# Patient Record
Sex: FEMALE | Race: WHITE | NOT HISPANIC OR LATINO | Employment: STUDENT | URBAN - METROPOLITAN AREA
[De-identification: names, ages, dates, MRNs, and addresses within clinical notes are randomized per-mention and may not be internally consistent; named-entity substitution may affect disease eponyms.]

---

## 2017-12-28 ENCOUNTER — TRANSCRIBE ORDERS (OUTPATIENT)
Dept: ADMINISTRATIVE | Facility: HOSPITAL | Age: 22
End: 2017-12-28

## 2017-12-28 ENCOUNTER — HOSPITAL ENCOUNTER (OUTPATIENT)
Dept: RADIOLOGY | Facility: HOSPITAL | Age: 22
Discharge: HOME | End: 2017-12-28
Attending: INTERNAL MEDICINE
Payer: COMMERCIAL

## 2017-12-28 ENCOUNTER — GENERIC CONVERSION - ENCOUNTER (OUTPATIENT)
Dept: OTHER | Facility: OTHER | Age: 22
End: 2017-12-28

## 2017-12-28 DIAGNOSIS — R07.9 CHEST PAIN, UNSPECIFIED TYPE: ICD-10-CM

## 2017-12-28 DIAGNOSIS — R06.02 SOB (SHORTNESS OF BREATH): ICD-10-CM

## 2017-12-28 DIAGNOSIS — R06.02 SOB (SHORTNESS OF BREATH): Primary | ICD-10-CM

## 2017-12-28 PROCEDURE — 71275 CT ANGIOGRAPHY CHEST: CPT

## 2017-12-28 RX ADMIN — IOHEXOL 85 ML: 350 INJECTION, SOLUTION INTRAVENOUS at 11:16

## 2019-05-15 ENCOUNTER — OFFICE VISIT (OUTPATIENT)
Dept: OBGYN CLINIC | Facility: CLINIC | Age: 24
End: 2019-05-15
Payer: COMMERCIAL

## 2019-05-15 VITALS
BODY MASS INDEX: 27.56 KG/M2 | WEIGHT: 146 LBS | SYSTOLIC BLOOD PRESSURE: 130 MMHG | HEIGHT: 61 IN | DIASTOLIC BLOOD PRESSURE: 84 MMHG

## 2019-05-15 DIAGNOSIS — Z71.85 HPV VACCINE COUNSELING: ICD-10-CM

## 2019-05-15 DIAGNOSIS — Z01.419 ENCOUNTER FOR GYNECOLOGICAL EXAMINATION (GENERAL) (ROUTINE) WITHOUT ABNORMAL FINDINGS: Primary | ICD-10-CM

## 2019-05-15 PROCEDURE — 99385 PREV VISIT NEW AGE 18-39: CPT | Performed by: PHYSICIAN ASSISTANT

## 2019-05-15 RX ORDER — NORETHINDRONE ACETATE AND ETHINYL ESTRADIOL AND FERROUS FUMARATE 1.5-30(21)
1 KIT ORAL DAILY
Refills: 5 | COMMUNITY
Start: 2019-04-20 | End: 2019-08-07 | Stop reason: SDUPTHER

## 2019-05-15 RX ORDER — PREDNISONE 10 MG/1
TABLET ORAL
Refills: 0 | COMMUNITY
Start: 2019-05-02 | End: 2021-10-21

## 2019-05-27 LAB — THIN PREP CVX: NORMAL

## 2019-08-07 ENCOUNTER — TELEPHONE (OUTPATIENT)
Dept: OBGYN CLINIC | Facility: CLINIC | Age: 24
End: 2019-08-07

## 2019-08-07 DIAGNOSIS — Z30.41 ENCOUNTER FOR SURVEILLANCE OF CONTRACEPTIVE PILLS: Primary | ICD-10-CM

## 2019-08-07 RX ORDER — NORETHINDRONE ACETATE AND ETHINYL ESTRADIOL AND FERROUS FUMARATE 1.5-30(21)
1 KIT ORAL DAILY
Qty: 90 TABLET | Refills: 3 | Status: SHIPPED | OUTPATIENT
Start: 2019-08-07 | End: 2021-10-21

## 2019-09-16 ENCOUNTER — TELEPHONE (OUTPATIENT)
Dept: OBGYN CLINIC | Facility: CLINIC | Age: 24
End: 2019-09-16

## 2019-09-16 NOTE — TELEPHONE ENCOUNTER
Apt scheduled  [facility] since states gets [facility]   Pt to call back if seems abnormal for her otherwise will keep apt as scheduled

## 2019-12-28 ENCOUNTER — APPOINTMENT (EMERGENCY)
Dept: RADIOLOGY | Facility: HOSPITAL | Age: 24
End: 2019-12-28
Payer: COMMERCIAL

## 2019-12-28 ENCOUNTER — HOSPITAL ENCOUNTER (EMERGENCY)
Facility: HOSPITAL | Age: 24
Discharge: HOME | End: 2019-12-28
Attending: EMERGENCY MEDICINE | Admitting: EMERGENCY MEDICINE
Payer: COMMERCIAL

## 2019-12-28 VITALS
TEMPERATURE: 98.6 F | BODY MASS INDEX: 28.34 KG/M2 | WEIGHT: 150 LBS | HEART RATE: 64 BPM | SYSTOLIC BLOOD PRESSURE: 112 MMHG | DIASTOLIC BLOOD PRESSURE: 57 MMHG | RESPIRATION RATE: 20 BRPM | OXYGEN SATURATION: 98 %

## 2019-12-28 DIAGNOSIS — R07.89 CHEST WALL PAIN: Primary | ICD-10-CM

## 2019-12-28 LAB
ANION GAP SERPL CALCULATED.3IONS-SCNC: 7 MMOL/L (ref 4–13)
ATRIAL RATE: 67 BPM
BASOPHILS # BLD AUTO: 0.02 THOUSANDS/ΜL (ref 0–0.1)
BASOPHILS NFR BLD AUTO: 0 % (ref 0–1)
BUN SERPL-MCNC: 14 MG/DL (ref 5–25)
CALCIUM SERPL-MCNC: 9.4 MG/DL (ref 8.3–10.1)
CHLORIDE SERPL-SCNC: 108 MMOL/L (ref 100–108)
CO2 SERPL-SCNC: 26 MMOL/L (ref 21–32)
CREAT SERPL-MCNC: 0.81 MG/DL (ref 0.6–1.3)
EOSINOPHIL # BLD AUTO: 0.03 THOUSAND/ΜL (ref 0–0.61)
EOSINOPHIL NFR BLD AUTO: 1 % (ref 0–6)
ERYTHROCYTE [DISTWIDTH] IN BLOOD BY AUTOMATED COUNT: 11.9 % (ref 11.6–15.1)
EXT PREG TEST URINE: NEGATIVE
EXT. CONTROL ED NAV: NORMAL
GFR SERPL CREATININE-BSD FRML MDRD: 103 ML/MIN/1.73SQ M
GLUCOSE SERPL-MCNC: 86 MG/DL (ref 65–140)
HCT VFR BLD AUTO: 38.3 % (ref 34.8–46.1)
HGB BLD-MCNC: 12.7 G/DL (ref 11.5–15.4)
IMM GRANULOCYTES # BLD AUTO: 0.02 THOUSAND/UL (ref 0–0.2)
IMM GRANULOCYTES NFR BLD AUTO: 0 % (ref 0–2)
LYMPHOCYTES # BLD AUTO: 2.47 THOUSANDS/ΜL (ref 0.6–4.47)
LYMPHOCYTES NFR BLD AUTO: 41 % (ref 14–44)
MCH RBC QN AUTO: 30.5 PG (ref 26.8–34.3)
MCHC RBC AUTO-ENTMCNC: 33.2 G/DL (ref 31.4–37.4)
MCV RBC AUTO: 92 FL (ref 82–98)
MONOCYTES # BLD AUTO: 0.41 THOUSAND/ΜL (ref 0.17–1.22)
MONOCYTES NFR BLD AUTO: 7 % (ref 4–12)
NEUTROPHILS # BLD AUTO: 3.11 THOUSANDS/ΜL (ref 1.85–7.62)
NEUTS SEG NFR BLD AUTO: 51 % (ref 43–75)
NRBC BLD AUTO-RTO: 0 /100 WBCS
P AXIS: 37 DEGREES
PLATELET # BLD AUTO: 265 THOUSANDS/UL (ref 149–390)
PMV BLD AUTO: 9.3 FL (ref 8.9–12.7)
POTASSIUM SERPL-SCNC: 3.7 MMOL/L (ref 3.5–5.3)
PR INTERVAL: 154 MS
QRS AXIS: 54 DEGREES
QRSD INTERVAL: 78 MS
QT INTERVAL: 416 MS
QTC INTERVAL: 439 MS
RBC # BLD AUTO: 4.16 MILLION/UL (ref 3.81–5.12)
SODIUM SERPL-SCNC: 141 MMOL/L (ref 136–145)
T WAVE AXIS: 46 DEGREES
TROPONIN I SERPL-MCNC: <0.02 NG/ML
VENTRICULAR RATE: 67 BPM
WBC # BLD AUTO: 6.06 THOUSAND/UL (ref 4.31–10.16)

## 2019-12-28 PROCEDURE — 80048 BASIC METABOLIC PNL TOTAL CA: CPT | Performed by: EMERGENCY MEDICINE

## 2019-12-28 PROCEDURE — 81025 URINE PREGNANCY TEST: CPT | Performed by: EMERGENCY MEDICINE

## 2019-12-28 PROCEDURE — 84484 ASSAY OF TROPONIN QUANT: CPT | Performed by: EMERGENCY MEDICINE

## 2019-12-28 PROCEDURE — 99285 EMERGENCY DEPT VISIT HI MDM: CPT

## 2019-12-28 PROCEDURE — 85025 COMPLETE CBC W/AUTO DIFF WBC: CPT | Performed by: EMERGENCY MEDICINE

## 2019-12-28 PROCEDURE — 93010 ELECTROCARDIOGRAM REPORT: CPT | Performed by: INTERNAL MEDICINE

## 2019-12-28 PROCEDURE — 93005 ELECTROCARDIOGRAM TRACING: CPT

## 2019-12-28 PROCEDURE — 71046 X-RAY EXAM CHEST 2 VIEWS: CPT

## 2019-12-28 PROCEDURE — 99284 EMERGENCY DEPT VISIT MOD MDM: CPT | Performed by: EMERGENCY MEDICINE

## 2019-12-28 PROCEDURE — 96372 THER/PROPH/DIAG INJ SC/IM: CPT

## 2019-12-28 PROCEDURE — 96360 HYDRATION IV INFUSION INIT: CPT

## 2019-12-28 PROCEDURE — 36415 COLL VENOUS BLD VENIPUNCTURE: CPT | Performed by: EMERGENCY MEDICINE

## 2019-12-28 RX ORDER — KETOROLAC TROMETHAMINE 30 MG/ML
15 INJECTION, SOLUTION INTRAMUSCULAR; INTRAVENOUS ONCE
Status: COMPLETED | OUTPATIENT
Start: 2019-12-28 | End: 2019-12-28

## 2019-12-28 RX ADMIN — KETOROLAC TROMETHAMINE 15 MG: 30 INJECTION, SOLUTION INTRAMUSCULAR at 15:23

## 2019-12-28 RX ADMIN — SODIUM CHLORIDE 1000 ML: 0.9 INJECTION, SOLUTION INTRAVENOUS at 15:00

## 2019-12-28 NOTE — DISCHARGE INSTRUCTIONS
Take tylenol and/or ibuprofen for pain  Your chest x-ray, EKG, and troponin level, and CBC/BMP blood work was all normal

## 2019-12-28 NOTE — ED PROVIDER NOTES
History  Chief Complaint   Patient presents with    Chest Pain     Patient reports mid sternal chest pain that does not radiate  States she "has had this in the past, had MRI, EKG and they never found anything"  Patient is a 16-year-old female with no significant past medical history who presents to the emergency department for evaluation of chest pain  Her chest pain started suddenly on Wednesday while she was at rest, is has been constant since onset, and has not changed in quality or intensity  She describes it is a dull ache located substernally  Her pain does not radiate  There are no exacerbating or alleviating factors to her pain, is not pleuritic in nature  She denies cough, recent URI, fevers or chills  She also endorses exertion dyspnea, but is able to ambulate long distances without having to rest  She denies lower extremity edema, calf pain or tenderness  She notes that she has had intermittent lightheadedness, but denies near-syncope or syncope, no headache, neck pain or neck stiffness, no focal neurological deficits hitting motor weakness or sensory deficits  She denies vaginal bleeding  Last menstrual period was 1 week ago  He is on contraception, estrogen and progestin containing of OCP  No personal history of DVT/PE, no family history of DVT/PE, no recent surgeries or prolonged immobilizations, no history of cancer, no asymmetric lower extremity edema  Does have a prior history of chest pain, states that several years ago she had chest pain and had a workup and was told everything was normal " No drugs, no alcohol, nonsmoker        History provided by:  Patient   used: No    Chest Pain   Pain location:  Substernal area  Pain quality: dull    Pain radiates to:  Does not radiate  Pain radiates to the back: no    Onset quality:  Sudden  Duration:  4 days  Timing:  Constant  Progression:  Unchanged  Chronicity:  Recurrent  Context: at rest    Context: not breathing, no movement, no stress and no trauma    Relieved by:  None tried  Worsened by:  Nothing tried  Ineffective treatments:  None tried  Associated symptoms: shortness of breath    Associated symptoms: no abdominal pain, no back pain, no cough, no dizziness, no fever, no headache, no nausea, no palpitations, not vomiting and no weakness    Risk factors: no coronary artery disease, no diabetes mellitus, no high cholesterol, no hypertension, not pregnant, no prior DVT/PE and no smoking        Prior to Admission Medications   Prescriptions Last Dose Informant Patient Reported? Taking? JUNEL FE 1 5/30 1 5-30 MG-MCG tablet 12/27/2019 at Unknown time  No Yes   Sig: Take 1 tablet by mouth daily   predniSONE 10 mg tablet Not Taking at Unknown time  Yes No   Sig: TAKE 8 TABS X 3 DAYS, 6 TABS X 3 DAYS, 4 TABS X 3 DAYS, 3 TABS X 3 DAYS, 2 TABS X 3 DAYS,1 TAB DAILY      Facility-Administered Medications: None       Past Medical History:   Diagnosis Date    Asthma        Past Surgical History:   Procedure Laterality Date    TONSILLECTOMY         Family History   Problem Relation Age of Onset    No Known Problems Mother     No Known Problems Father      I have reviewed and agree with the history as documented  Social History     Tobacco Use    Smoking status: Never Smoker    Smokeless tobacco: Never Used   Substance Use Topics    Alcohol use: Yes     Frequency: 2-3 times a week    Drug use: Never        Review of Systems   Constitutional: Negative  Negative for appetite change, chills and fever  HENT: Negative  Eyes: Negative  Negative for photophobia and visual disturbance  Respiratory: Positive for shortness of breath  Negative for cough and chest tightness  Cardiovascular: Positive for chest pain  Negative for palpitations and leg swelling  Gastrointestinal: Negative  Negative for abdominal pain, blood in stool, constipation, diarrhea, nausea and vomiting  Endocrine: Negative      Genitourinary: Negative  Negative for difficulty urinating, dysuria, flank pain, frequency and urgency  Musculoskeletal: Negative  Negative for back pain, neck pain and neck stiffness  Skin: Negative  Allergic/Immunologic: Negative  Neurological: Positive for light-headedness  Negative for dizziness, weakness and headaches  Hematological: Negative  Psychiatric/Behavioral: Negative  Physical Exam  ED Triage Vitals [12/28/19 1339]   Temperature Pulse Respirations Blood Pressure SpO2   98 6 °F (37 °C) 69 18 155/85 99 %      Temp Source Heart Rate Source Patient Position - Orthostatic VS BP Location FiO2 (%)   Oral Monitor Sitting Left arm --      Pain Score       8             Orthostatic Vital Signs  Vitals:    12/28/19 1339 12/28/19 1456 12/28/19 1530   BP: 155/85 123/57 112/57   Pulse: 69 58 64   Patient Position - Orthostatic VS: Sitting Lying        Physical Exam   Constitutional: She is oriented to person, place, and time  She appears well-developed and well-nourished  HENT:   Head: Normocephalic and atraumatic  Right Ear: External ear normal    Left Ear: External ear normal    Nose: Nose normal    Mouth/Throat: Oropharynx is clear and moist    Eyes: Conjunctivae and EOM are normal  No scleral icterus  Neck: Normal range of motion  No JVD present  No tracheal deviation present  Cardiovascular: Normal rate, regular rhythm, normal heart sounds and intact distal pulses  No murmur heard  Pulmonary/Chest: Effort normal and breath sounds normal  No respiratory distress  She exhibits tenderness  She exhibits no crepitus  Abdominal: Soft  Bowel sounds are normal  She exhibits no distension  There is no tenderness  There is no guarding  Musculoskeletal: Normal range of motion  She exhibits no edema  Neurological: She is alert and oriented to person, place, and time  She exhibits normal muscle tone  Skin: Skin is warm and dry  Capillary refill takes less than 2 seconds   She is not diaphoretic  Psychiatric: She has a normal mood and affect  Her behavior is normal    Vitals reviewed        ED Medications  Medications   ketorolac (TORADOL) injection 15 mg (15 mg Intramuscular Given 12/28/19 1523)   sodium chloride 0 9 % bolus 1,000 mL (0 mL Intravenous Stopped 12/28/19 1550)       Diagnostic Studies  Results Reviewed     Procedure Component Value Units Date/Time    Troponin I [[id number]]  (Normal) Collected:  12/28/19 1458    Lab Status:  Final result Specimen:  Blood from Arm, Right Updated:  12/28/19 1524     Troponin I <0 02 ng/mL     Basic metabolic panel [[id number]] Collected:  12/28/19 1458    Lab Status:  Final result Specimen:  Blood from Arm, Right Updated:  12/28/19 1520     Sodium 141 mmol/L      Potassium 3 7 mmol/L      Chloride 108 mmol/L      CO2 26 mmol/L      ANION GAP 7 mmol/L      BUN 14 mg/dL      Creatinine 0 81 mg/dL      Glucose 86 mg/dL      Calcium 9 4 mg/dL      eGFR 103 ml/min/1 73sq m     Narrative:       [place] guidelines for Chronic Kidney Disease (CKD):     Stage 1 with normal or high GFR (GFR > 90 mL/min/1 73 square meters)    Stage 2 Mild CKD (GFR = 60-89 mL/min/1 73 square meters)    Stage 3A Moderate CKD (GFR = 45-59 mL/min/1 73 square meters)    Stage 3B Moderate CKD (GFR = 30-44 mL/min/1 73 square meters)    Stage 4 Severe CKD (GFR = 15-29 mL/min/1 73 square meters)    Stage 5 End Stage CKD (GFR <15 mL/min/1 73 square meters)  Note: GFR calculation is accurate only with a steady state creatinine    CBC and differential [[id number]] Collected:  12/28/19 1458    Lab Status:  Final result Specimen:  Blood from Arm, Right Updated:  12/28/19 1507     WBC 6 06 Thousand/uL      RBC 4 16 Million/uL      Hemoglobin 12 7 g/dL      Hematocrit 38 3 %      MCV 92 fL      MCH 30 5 pg      MCHC 33 2 g/dL      RDW 11 9 %      MPV 9 3 fL      Platelets 905 Thousands/uL      nRBC 0 /100 WBCs      Neutrophils Relative 51 %      Immat GRANS % 0 %      Lymphocytes Relative 41 %      Monocytes Relative 7 %      Eosinophils Relative 1 %      Basophils Relative 0 %      Neutrophils Absolute 3 11 Thousands/µL      Immature Grans Absolute 0 02 Thousand/uL      Lymphocytes Absolute 2 47 Thousands/µL      Monocytes Absolute 0 41 Thousand/µL      Eosinophils Absolute 0 03 Thousand/µL      Basophils Absolute 0 02 Thousands/µL     POCT pregnancy, urine [[id number]]  (Normal) Resulted:  12/28/19 1504    Lab Status:  Final result Updated:  12/28/19 1504     EXT PREG TEST UR (Ref: Negative) NEGATIVE     Control VALID                 XR chest 2 views   ED Interpretation by Kt Hill DO (12/28 1522)   No acute cardiopulmonary processes             Procedures  Procedures      ED Course  ED Course as of Dec 28 1659   Sat Dec 28, 2019   1524 Procedure Note: EKG  Date/Time: 12/28/19 3:24 PM   Interpreted by: Phil Crockett DO  Indications / Diagnosis: CP  ECG reviewed by me, the ED Physician: yes   The EKG demonstrates:  Rhythm: normal sinus w/ sinus arrhythmia   Intervals: normal intervals  Axis: normal axis  QRS/Blocks: normal QRS  ST Changes: No acute ST Changes, no STD/LESVIA  Juvenile T wave inversion anterior precordial leads               HEART Risk Score      Most Recent Value   History  0 Filed at: 12/28/2019 1651   ECG  --   Age  0 Filed at: 12/28/2019 1651   Risk Factors  0 Filed at: 12/28/2019 1651   Troponin  0 Filed at: 12/28/2019 1651   Heart Score Risk Calculator   History  0 Filed at: 12/28/2019 1651   ECG  --   Age  0 Filed at: 12/28/2019 1651   Risk Factors  0 Filed at: 12/28/2019 1651   Troponin  0 Filed at: 12/28/2019 1651   HEART Score  --   HEART Score  --                            MDM  Number of Diagnoses or Management Options  Chest wall pain: new and requires workup  Diagnosis management comments: 46-year-old female with constant chest pain for 3 days    Will obtain EKG to look for acute ischemic changes, troponin, chest x-ray to evaluate for other causes of chest pain such as a pneumothorax, pneumonia and CBC to evaluate for anemia  Chest x-ray reveals no acute cardiopulmonary processes  EKG reveals sinus rhythm with a sinus arrhythmia, no ST or T-wave changes, no ST elevation or depression, normal axis and normal intervals  Troponin is within normal limits, and a CBC is within normal limits  Patient is not tachycardic, no hypoxia, her respiratory rate is normal   Patient has reproducible chest pain on exam, will give Toradol for pain  Patient was reassessed after Toradol was given and she noted improvement in her discomfort  Will discharge patient home, heart score of 0  Amount and/or Complexity of Data Reviewed  Clinical lab tests: ordered and reviewed  Tests in the radiology section of CPT®: ordered and reviewed  Review and summarize past medical records: yes  Independent visualization of images, tracings, or specimens: yes          Disposition  Final diagnoses:   Chest wall pain     Time reflects when diagnosis was documented in both MDM as applicable and the Disposition within this note     Time User Action Codes Description Comment    12/28/2019  3:22 PM Kwaku Dhillon Add [R07 89] Chest wall pain       ED Disposition     ED Disposition Condition Date/Time Comment    Discharge Stable Sat Dec 28, 2019  3:22 PM Dani Patton discharge to home/self care              Follow-up Information     Follow up With Specialties Details Why Contact Info Additional Information    Rebekah Ellis MD Internal Medicine Call in 1 week  [address]  [address] [facility] Emergency Department Emergency Medicine Go to  As needed, If symptoms worsen [address]  [phone]  ED, [address] [place] [address], [address]   [phone]          Discharge Medication List as of 12/28/2019  3:41 PM      CONTINUE these medications which have NOT CHANGED Details   JUNEL FE 1 5/30 1 5-30 MG-MCG tablet Take 1 tablet by mouth daily, Starting Wed 8/7/2019, Normal      predniSONE 10 mg tablet TAKE 8 TABS X 3 DAYS, 6 TABS X 3 DAYS, 4 TABS X 3 DAYS, 3 TABS X 3 DAYS, 2 TABS X 3 DAYS,1 TAB DAILY, Historical Med           No discharge procedures on file  ED Provider  Attending physically available and evaluated Darrion Gillespie I managed the patient along with the ED Attending      Electronically Signed by         Johnson Camargo DO  12/28/19 5259

## 2019-12-28 NOTE — ED ATTENDING ATTESTATION
Kareen Ruth MD, saw and evaluated the patient  All available labs and X-rays were ordered by me or the resident and have been reviewed by myself  I discussed the patient with the resident / non-physician and agree with the resident's / non-physician practitioner's findings and plan as documented in the resident's / non-physician practicitioner's note, except where noted  At this point, I agree with the current assessment done in the ED  I was present during key portions of all procedures performed unless otherwise stated  Chief Complaint   Patient presents with    Chest Pain     Patient reports mid sternal chest pain that does not radiate  States she "has had this in the past, had MRI, EKG and they never found anything"  This is a 61-year-old female presenting evaluation chest pain  Patient states that beginning 3 half to 4 days ago she has been having constant chest pain in the central sternal region  Unrelated to food intake  Unrelated to movement  If she presses on the region gets worse  She had similar several years ago and had cardiac workup including MRI and everything was normal   It was done somewhere else outside of [facility]  The pain started unrelated to exertion  Moving around does not make it worse  No fevers chills nausea vomiting  She says that she sometimes short of breath to the resident but denied this to me  She said that she was lightheaded at times feeling like she is going to fall over but no vertiginous sensation  Denies cough congestion rhinorrhea sore throat  No sick contacts  No falls no injuries no lower extremity edema  She is on birth control that contains estrogen  No urinary symptoms including burning itching pain blood frequency  No numbness or tingling down the arms or legs  No arm pain jaw pain back pain    No diaphoresis  PMH:  - Asthma  - Estrogen containing birth control  PSH:  - tonsillectomy  No smoking drinking drugs  PE:  Vitals: 12/28/19 1339 12/28/19 1456 12/28/19 1530   BP: 155/85 123/57 112/57   BP Location: Left arm Right arm    Pulse: 69 58 64   Resp: 18 18 20   Temp: 98 6 °F (37 °C)     TempSrc: Oral     SpO2: 99% 98% 98%   Weight: 68 kg (150 lb)     General: VSS, NAD, awake, alert  Well-nourished, well-developed  Appears stated age  Speaking normally in full sentences  Head: Normocephalic, atraumatic, nontender  Eyes: PERRL, EOM-I  No diplopia  No hyphema  No subconjunctival hemorrhages  Symmetrical lids  ENT: Atraumatic external nose and ears  MMM  No malocclusion  No stridor  Normal phonation  No drooling  Normal swallowing  Neck: Symmetric, trachea midline  No JVD  CV: RRR  +S1/S2  No murmurs or gallops  Peripheral pulses +2 throughout  +midline chest wall tenderness (reproducible) sternal    2+ radials  Lungs:   Unlabored No retractions  CTAB, lungs sounds equal bilateral    No tachypnea  Abd: +BS, soft, NT/ND    MSK:   FROM   Back:   No rashes  Skin: Dry, intact  Neuro: AAOx3, GCS 15, CN II-XII grossly intact  Motor grossly intact  Psychiatric/Behavioral: Appropriate mood and affect   Exam: deferred  A:  - CP  P:  - constant x4 days  - nonexertional w/o SOB, nor hypoxia or tachycardia ? very much doubt PE clinically  - Will do trop/EKG given dizziness + CP  - Patient is not high-risk for syncope as the patient has no high risk criteria including: CHF hx, hematocrit<30%, Abnormal EKG (new EKG changes from any source, any non-sinus rhythm or monitoring), SOB symptoms, systolic BP < 90 at triage  So, patient IS in the low-risk group for serious outcome  - 13 point ROS was performed and all are normal unless stated in the history above  - Nursing note reviewed  Vitals reviewed  - Orders placed by myself and/or advanced practitioner / resident     - Previous chart was reviewed  - No language barrier    - History obtained from patient  - There are no limitations to the history obtained     - Critical care time: Not applicable for this patient  Code Status: No Order  Advance Directive and Living Will:      Power of :    POLST:      Final Diagnosis:  1  Chest wall pain           Medications   ketorolac (TORADOL) injection 15 mg (15 mg Intramuscular Given 12/28/19 1523)   sodium chloride 0 9 % bolus 1,000 mL (0 mL Intravenous Stopped 12/28/19 1550)     XR chest 2 views   ED Interpretation   No acute cardiopulmonary processes       Final Result      No acute cardiopulmonary disease  Workstation performed: [id number]           Orders Placed This Encounter   Procedures    XR chest 2 views    Troponin I    CBC and differential    Basic metabolic panel    EKG RESULTS    POCT pregnancy, urine    ECG 12 lead    ECG 12 lead     Labs Reviewed   TROPONIN I - Normal       Result Value Ref Range Status    Troponin I <0 02  <=0 04 ng/mL Final    Comment:   [employer] Chemistry analyzer 99% cutoff is > 0 04 ng/mL in network labs     o cTnI 99% cutoff is useful only when applied to patients in the clinical setting of myocardial ischemia   o cTnI 99% cutoff should be interpreted in the context of clinical history, ECG findings and possibly cardiac imaging to establish correct diagnosis  o cTnI 99% cutoff may be suggestive but clearly not indicative of a coronary event without the clinical setting of myocardial ischemia       POCT PREGNANCY, URINE - Normal    EXT PREG TEST UR (Ref: Negative) NEGATIVE   Final    Control VALID   Final   CBC AND DIFFERENTIAL    WBC 6 06  4 31 - 10 16 Thousand/uL Final    RBC 4 16  3 81 - 5 12 Million/uL Final    Hemoglobin 12 7  11 5 - 15 4 g/dL Final    Hematocrit 38 3  34 8 - 46 1 % Final    MCV 92  82 - 98 fL Final    MCH 30 5  26 8 - 34 3 pg Final    MCHC 33 2  31 4 - 37 4 g/dL Final    RDW 11 9  11 6 - 15 1 % Final    MPV 9 3  8 9 - 12 7 fL Final    Platelets 148  359 - 390 Thousands/uL Final    nRBC 0  /100 WBCs Final    Neutrophils Relative 51  43 - 75 % Final Immat GRANS % 0  0 - 2 % Final    Lymphocytes Relative 41  14 - 44 % Final    Monocytes Relative 7  4 - 12 % Final    Eosinophils Relative 1  0 - 6 % Final    Basophils Relative 0  0 - 1 % Final    Neutrophils Absolute 3 11  1 85 - 7 62 Thousands/µL Final    Immature Grans Absolute 0 02  0 00 - 0 20 Thousand/uL Final    Lymphocytes Absolute 2 47  0 60 - 4 47 Thousands/µL Final    Monocytes Absolute 0 41  0 17 - 1 22 Thousand/µL Final    Eosinophils Absolute 0 03  0 00 - 0 61 Thousand/µL Final    Basophils Absolute 0 02  0 00 - 0 10 Thousands/µL Final   BASIC METABOLIC PANEL    Sodium 117  136 - 145 mmol/L Final    Potassium 3 7  3 5 - 5 3 mmol/L Final    Chloride 108  100 - 108 mmol/L Final    CO2 26  21 - 32 mmol/L Final    ANION GAP 7  4 - 13 mmol/L Final    BUN 14  5 - 25 mg/dL Final    Creatinine 0 81  0 60 - 1 30 mg/dL Final    Comment: Standardized to IDMS reference method    Glucose 86  65 - 140 mg/dL Final    Comment:   If the patient is fasting, the ADA then defines impaired fasting glucose as > 100 mg/dL and diabetes as > or equal to 123 mg/dL  Specimen collection should occur prior to Sulfasalazine administration due to the potential for falsely depressed results  Specimen collection should occur prior to Sulfapyridine administration due to the potential for falsely elevated results      Calcium 9 4  8 3 - 10 1 mg/dL Final    eGFR 103  ml/min/1 73sq m Final    Narrative:     [place] guidelines for Chronic Kidney Disease (CKD):     Stage 1 with normal or high GFR (GFR > 90 mL/min/1 73 square meters)    Stage 2 Mild CKD (GFR = 60-89 mL/min/1 73 square meters)    Stage 3A Moderate CKD (GFR = 45-59 mL/min/1 73 square meters)    Stage 3B Moderate CKD (GFR = 30-44 mL/min/1 73 square meters)    Stage 4 Severe CKD (GFR = 15-29 mL/min/1 73 square meters)    Stage 5 End Stage CKD (GFR <15 mL/min/1 73 square meters)  Note: GFR calculation is accurate only with a steady state creatinine     Time reflects when diagnosis was documented in both MDM as applicable and the Disposition within this note     Time User Action Codes Description Comment    12/28/2019  3:22 PM Aurelio Fernández Add [R07 89] Chest wall pain       ED Disposition     ED Disposition Condition Date/Time Comment    Discharge Stable Sat Dec 28, 2019  3:22 PM Ed Ponce discharge to home/self care  Follow-up Information     Follow up With Specialties Details Why Contact Info Additional Information    Tammy Garcia MD Internal Medicine Call in 1 week  [address]  [address] [facility] Emergency Department Emergency Medicine Go to  As needed, If symptoms worsen [address]  [phone]  ED, [address], [facility] [address]        Discharge Medication List as of 12/28/2019  3:41 PM      CONTINUE these medications which have NOT CHANGED    Details   JUNEL FE 1 5/30 1 5-30 MG-MCG tablet Take 1 tablet by mouth daily, Starting Wed 8/7/2019, Normal      predniSONE 10 mg tablet TAKE 8 TABS X 3 DAYS, 6 TABS X 3 DAYS, 4 TABS X 3 DAYS, 3 TABS X 3 DAYS, 2 TABS X 3 DAYS,1 TAB DAILY, Historical Med           No discharge procedures on file  Prior to Admission Medications   Prescriptions Last Dose Informant Patient Reported? Taking? JUNEL FE 1 5/30 1 5-30 MG-MCG tablet 12/27/2019 at Unknown time  No Yes   Sig: Take 1 tablet by mouth daily   predniSONE 10 mg tablet Not Taking at Unknown time  Yes No   Sig: TAKE 8 TABS X 3 DAYS, 6 TABS X 3 DAYS, 4 TABS X 3 DAYS, 3 TABS X 3 DAYS, 2 TABS X 3 DAYS,1 TAB DAILY      Facility-Administered Medications: None       Portions of the record may have been created with voice recognition software  Occasional wrong word or "sound a like" substitutions may have occurred due to the inherent limitations of voice recognition software   Read the chart carefully and recognize, using context, where substitutions have occurred      Electronically signed by:  Christina Meyers

## 2020-06-19 DIAGNOSIS — Z30.41 ENCOUNTER FOR SURVEILLANCE OF CONTRACEPTIVE PILLS: ICD-10-CM

## 2020-06-23 RX ORDER — NORETHINDRONE ACETATE AND ETHINYL ESTRADIOL AND FERROUS FUMARATE 1.5-30(21)
KIT ORAL
Qty: 84 TABLET | Refills: 3 | OUTPATIENT
Start: 2020-06-23

## 2021-10-21 ENCOUNTER — ANNUAL EXAM (OUTPATIENT)
Dept: OBGYN CLINIC | Facility: CLINIC | Age: 26
End: 2021-10-21
Payer: COMMERCIAL

## 2021-10-21 VITALS
SYSTOLIC BLOOD PRESSURE: 122 MMHG | WEIGHT: 153.8 LBS | BODY MASS INDEX: 28.3 KG/M2 | DIASTOLIC BLOOD PRESSURE: 74 MMHG | HEIGHT: 62 IN

## 2021-10-21 DIAGNOSIS — N92.6 IRREGULAR BLEEDING: ICD-10-CM

## 2021-10-21 DIAGNOSIS — Z01.419 WELL WOMAN EXAM: Primary | ICD-10-CM

## 2021-10-21 PROCEDURE — 87591 N.GONORRHOEAE DNA AMP PROB: CPT | Performed by: PHYSICIAN ASSISTANT

## 2021-10-21 PROCEDURE — 87661 TRICHOMONAS VAGINALIS AMPLIF: CPT | Performed by: PHYSICIAN ASSISTANT

## 2021-10-21 PROCEDURE — 0503F POSTPARTUM CARE VISIT: CPT | Performed by: PHYSICIAN ASSISTANT

## 2021-10-21 PROCEDURE — 87491 CHLMYD TRACH DNA AMP PROBE: CPT | Performed by: PHYSICIAN ASSISTANT

## 2021-10-21 PROCEDURE — 99395 PREV VISIT EST AGE 18-39: CPT | Performed by: PHYSICIAN ASSISTANT

## 2021-10-21 RX ORDER — NORGESTREL AND ETHINYL ESTRADIOL 0.3-0.03MG
1 KIT ORAL DAILY
Qty: 84 TABLET | Refills: 3 | Status: SHIPPED | OUTPATIENT
Start: 2021-10-21

## 2021-10-24 LAB
C TRACH DNA SPEC QL NAA+PROBE: NEGATIVE
N GONORRHOEA DNA SPEC QL NAA+PROBE: NEGATIVE
T VAGINALIS RRNA SPEC QL NAA+PROBE: NEGATIVE

## 2022-03-10 ENCOUNTER — OFFICE VISIT (OUTPATIENT)
Dept: INTERNAL MEDICINE CLINIC | Facility: CLINIC | Age: 27
End: 2022-03-10
Payer: COMMERCIAL

## 2022-03-10 VITALS
OXYGEN SATURATION: 99 % | SYSTOLIC BLOOD PRESSURE: 124 MMHG | TEMPERATURE: 97.7 F | BODY MASS INDEX: 26.86 KG/M2 | HEIGHT: 63 IN | HEART RATE: 62 BPM | WEIGHT: 151.6 LBS | DIASTOLIC BLOOD PRESSURE: 66 MMHG

## 2022-03-10 DIAGNOSIS — Z28.21 INFLUENZA VACCINE REFUSED: ICD-10-CM

## 2022-03-10 DIAGNOSIS — Z92.29 COVID-19 VACCINE SERIES COMPLETED: ICD-10-CM

## 2022-03-10 DIAGNOSIS — Z11.59 ENCOUNTER FOR HEPATITIS C SCREENING TEST FOR LOW RISK PATIENT: ICD-10-CM

## 2022-03-10 DIAGNOSIS — M94.0 COSTOCHONDRITIS: ICD-10-CM

## 2022-03-10 DIAGNOSIS — R07.1 CHEST PAIN ON BREATHING: ICD-10-CM

## 2022-03-10 DIAGNOSIS — Z00.00 ANNUAL PHYSICAL EXAM: Primary | ICD-10-CM

## 2022-03-10 DIAGNOSIS — Z13.220 SCREENING CHOLESTEROL LEVEL: ICD-10-CM

## 2022-03-10 PROCEDURE — 99395 PREV VISIT EST AGE 18-39: CPT | Performed by: FAMILY MEDICINE

## 2022-03-10 PROCEDURE — 99203 OFFICE O/P NEW LOW 30 MIN: CPT | Performed by: FAMILY MEDICINE

## 2022-03-10 PROCEDURE — 93000 ELECTROCARDIOGRAM COMPLETE: CPT | Performed by: FAMILY MEDICINE

## 2022-03-10 NOTE — PROGRESS NOTES
Assessment/Plan:    1  Annual physical exam  -     Comprehensive metabolic panel; Future  -     CBC and differential; Future  -     TSH, 3rd generation with Free T4 reflex; Future    2  Chest pain on breathing  -     C-reactive protein; Future  -     Complete PFT with post bronchodilator; Future    3  COVID-19 vaccine series completed    4  Influenza vaccine refused    5  Encounter for hepatitis C screening test for low risk patient  -     Hepatitis C antibody; Future    6  Screening cholesterol level  -     Lipid panel; Future    7  Costochondritis        EKG: normal EKG, normal sinus rhythm  There are no Patient Instructions on file for this visit  Return for Annual physical     Subjective:      Patient ID: Kt De La Paz is a 27 y o  female  Chief Complaint   Patient presents with    Physical Exam     annual physical pt been having discomfort in chest when takes deep breath and whenshe does physical activity makes it harder to breath    Health Screening     dep screen       HPI      Adult Annual Physical   Patient here for a comprehensive physical exam  The patient reports no problems  Diet and Physical Activity  · Diet/Nutrition: well balanced diet  · Exercise: moderate cardiovascular exercise  Depression Screening  PHQ-9 Depression Screening    PHQ-9:    Frequency of the following problems over the past two weeks:            General Health  · Sleep: sleeps well  · Hearing: normal - bilateral   · Vision: no vision problems  · Dental: regular dental visits         /GYN Health  · Patient is: premenopausal  · Last menstrual period: monthly  · Contraceptive method: oral contraceptives    The following portions of the patient's history were reviewed and updated as appropriate: allergies, current medications, past family history, past medical history, past social history, past surgical history and problem list     Review of Systems      Constitutional:  Denies fever or chills   Eyes: Denies double , blurry vision or eye pain  HENT:  Denies nasal congestion or sore throat   Respiratory:  Denies cough or shortness of breath or wheezing  Cardiovascular:  Denies palpitations or chest pain  GI:  Denies abdominal pain, nausea, or vomiting, no loose stools, no reflux  Integument:  Denies rash , no open areas  Neurologic:  Denies headache or focal weakness, no dizziness  : no dysuria, or hematuria        Current Outpatient Medications   Medication Sig Dispense Refill    norgestrel-ethinyl estradiol (Low-Ogestrel) 0 3 mg-30 mcg per tablet Take 1 tablet by mouth daily 84 tablet 3     No current facility-administered medications for this visit         Objective:    /66 (BP Location: Left arm, Patient Position: Sitting, Cuff Size: Standard)   Pulse 62   Temp 97 7 °F (36 5 °C) (Tympanic)   Ht 5' 3 07" (1 602 m)   Wt 68 8 kg (151 lb 9 6 oz)   SpO2 99% Comment: room air  BMI 26 79 kg/m²        Physical Exam      Constitutional:  Well developed, well nourished, no acute distress, non-toxic appearance   Eyes:  PERRL, conjunctiva normal , non icteric sclera  HENT:  Atraumatic, oropharynx moist  Neck-  supple   Respiratory:  CTA b/l, normal breath sounds, no rales, no wheezing   Cardiovascular:  RRR, no murmurs, no LE edema b/l  GI:  Soft, nondistended, normal bowel sounds x 4, nontender, no organomegaly, no mass, no rebound, no guarding   Neurologic:  no focal deficits noted   Psychiatric:  Speech and behavior appropriate , AAO x 3           Delos Bis, DO

## 2022-03-10 NOTE — PROGRESS NOTES
Assessment/Plan:    1  Annual physical exam  -     Comprehensive metabolic panel; Future  -     CBC and differential; Future  -     TSH, 3rd generation with Free T4 reflex; Future    2  Chest pain on breathing  -     C-reactive protein; Future  -     Complete PFT with post bronchodilator; Future  -     POCT ECG    3  COVID-19 vaccine series completed    4  Influenza vaccine refused    5  Encounter for hepatitis C screening test for low risk patient  -     Hepatitis C antibody; Future    6  Screening cholesterol level  -     Lipid panel; Future    7  Costochondritis      Recommend over-the-counter ibuprofen or NSAIDs with food for 7-10 days for costochondritis  I asked patient to get her previous cardiac testing results to us  There are no Patient Instructions on file for this visit  Return for Annual physical     Subjective:      Patient ID: Carlos A Gtz is a 27 y o  female  Chief Complaint   Patient presents with    Physical Exam     annual physical pt been having discomfort in chest when takes deep breath and whenshe does physical activity makes it harder to breath    Health Screening     dep screen       HPI     Patient with intermittent chest pain since past 4-5 years  Had complete workup with CTA, chest x-ray, Holter monitor and echocardiogram and stress test   This was done at another facility however chest x-ray and CTA are available  Per patient her stress status echocardiogram and Holter monitor were all normal   She states it never went away but has been noticing it more wanted to get it checked  She states it happens when she takes large deep breaths however she is very active and does cardiovascular activity 4-5 times per week  Does not does not prevent her from doing that  She denies any wheezing any asthma history and she is on birth control which is not new for her  Location is sternal area and last only a few seconds    Patient denies any heavy lifting or trauma or rashes      The following portions of the patient's history were reviewed and updated as appropriate: allergies, current medications, past family history, past medical history, past social history, past surgical history and problem list     Review of Systems        Constitutional:  Denies fever or chills   Eyes:  Denies double , blurry vision or eye pain  HENT:  Denies nasal congestion or sore throat   Respiratory:  Denies cough or shortness of breath or wheezing  Cardiovascular:  Denies palpitations or chest pain  GI:  Denies abdominal pain, nausea, or vomiting, no loose stools, no reflux  Integument:  Denies rash , no open areas  Neurologic:  Denies headache or focal weakness, no dizziness  : no dysuria, or hematuria    Current Outpatient Medications   Medication Sig Dispense Refill    norgestrel-ethinyl estradiol (Low-Ogestrel) 0 3 mg-30 mcg per tablet Take 1 tablet by mouth daily 84 tablet 3     No current facility-administered medications for this visit         Objective:    /66 (BP Location: Left arm, Patient Position: Sitting, Cuff Size: Standard)   Pulse 62   Temp 97 7 °F (36 5 °C) (Tympanic)   Ht 5' 3 07" (1 602 m)   Wt 68 8 kg (151 lb 9 6 oz)   SpO2 99% Comment: room air  BMI 26 79 kg/m²        Physical Exam       Constitutional:  Well developed, well nourished, no acute distress, non-toxic appearance   Eyes:  PERRL, conjunctiva normal , non icteric sclera  HENT:  Atraumatic, oropharynx moist  Neck-  supple   Respiratory:  CTA b/l, normal breath sounds, no rales, no wheezing   Cardiovascular:  RRR, no murmurs, no LE edema b/l  GI:  Soft, nondistended, normal bowel sounds x 4, nontender, no organomegaly, no mass, no rebound, no guarding   Neurologic:  no focal deficits noted   Psychiatric:  Speech and behavior appropriate , AAO x 3  Musculoskeletal, tender to palpation in the sternal area      Willma Batters, DO

## 2022-03-22 ENCOUNTER — HOSPITAL ENCOUNTER (OUTPATIENT)
Dept: PULMONOLOGY | Facility: HOSPITAL | Age: 27
Discharge: HOME | End: 2022-03-22
Payer: COMMERCIAL

## 2022-03-22 DIAGNOSIS — R07.1 CHEST PAIN ON BREATHING: ICD-10-CM

## 2022-03-22 PROCEDURE — 94060 EVALUATION OF WHEEZING: CPT | Performed by: INTERNAL MEDICINE

## 2022-03-22 PROCEDURE — 94760 N-INVAS EAR/PLS OXIMETRY 1: CPT

## 2022-03-22 PROCEDURE — 94726 PLETHYSMOGRAPHY LUNG VOLUMES: CPT | Performed by: INTERNAL MEDICINE

## 2022-03-22 PROCEDURE — 94729 DIFFUSING CAPACITY: CPT | Performed by: INTERNAL MEDICINE

## 2022-03-22 PROCEDURE — 94726 PLETHYSMOGRAPHY LUNG VOLUMES: CPT

## 2022-03-22 PROCEDURE — 94060 EVALUATION OF WHEEZING: CPT

## 2022-03-22 PROCEDURE — 94729 DIFFUSING CAPACITY: CPT

## 2022-03-22 RX ORDER — ALBUTEROL SULFATE 2.5 MG/3ML
2.5 SOLUTION RESPIRATORY (INHALATION) ONCE
Status: COMPLETED | OUTPATIENT
Start: 2022-03-22 | End: 2022-03-22

## 2022-03-22 RX ADMIN — ALBUTEROL SULFATE 2.5 MG: 2.5 SOLUTION RESPIRATORY (INHALATION) at 08:51

## 2022-08-17 ENCOUNTER — TELEPHONE (OUTPATIENT)
Dept: PSYCHIATRY | Facility: CLINIC | Age: 27
End: 2022-08-17

## 2022-09-02 ENCOUNTER — VBI (OUTPATIENT)
Dept: ADMINISTRATIVE | Facility: OTHER | Age: 27
End: 2022-09-02

## 2022-09-07 ENCOUNTER — APPOINTMENT (OUTPATIENT)
Dept: LAB | Facility: CLINIC | Age: 27
End: 2022-09-07
Payer: COMMERCIAL

## 2022-09-07 DIAGNOSIS — Z13.220 SCREENING CHOLESTEROL LEVEL: ICD-10-CM

## 2022-09-07 DIAGNOSIS — R07.1 CHEST PAIN ON BREATHING: ICD-10-CM

## 2022-09-07 DIAGNOSIS — Z11.59 ENCOUNTER FOR HEPATITIS C SCREENING TEST FOR LOW RISK PATIENT: ICD-10-CM

## 2022-09-07 DIAGNOSIS — Z00.00 ANNUAL PHYSICAL EXAM: ICD-10-CM

## 2022-09-07 LAB
ALBUMIN SERPL BCP-MCNC: 4.1 G/DL (ref 3.5–5)
ALP SERPL-CCNC: 57 U/L (ref 46–116)
ALT SERPL W P-5'-P-CCNC: 20 U/L (ref 12–78)
ANION GAP SERPL CALCULATED.3IONS-SCNC: 5 MMOL/L (ref 4–13)
AST SERPL W P-5'-P-CCNC: 18 U/L (ref 5–45)
BASOPHILS # BLD AUTO: 0.02 THOUSANDS/ΜL (ref 0–0.1)
BASOPHILS NFR BLD AUTO: 1 % (ref 0–1)
BILIRUB SERPL-MCNC: 0.65 MG/DL (ref 0.2–1)
BUN SERPL-MCNC: 16 MG/DL (ref 5–25)
CALCIUM SERPL-MCNC: 9.5 MG/DL (ref 8.3–10.1)
CHLORIDE SERPL-SCNC: 108 MMOL/L (ref 96–108)
CHOLEST SERPL-MCNC: 135 MG/DL
CO2 SERPL-SCNC: 24 MMOL/L (ref 21–32)
CREAT SERPL-MCNC: 0.91 MG/DL (ref 0.6–1.3)
CRP SERPL QL: <3 MG/L
EOSINOPHIL # BLD AUTO: 0.16 THOUSAND/ΜL (ref 0–0.61)
EOSINOPHIL NFR BLD AUTO: 4 % (ref 0–6)
ERYTHROCYTE [DISTWIDTH] IN BLOOD BY AUTOMATED COUNT: 12.2 % (ref 11.6–15.1)
GFR SERPL CREATININE-BSD FRML MDRD: 87 ML/MIN/1.73SQ M
GLUCOSE P FAST SERPL-MCNC: 83 MG/DL (ref 65–99)
HCT VFR BLD AUTO: 41.1 % (ref 34.8–46.1)
HCV AB SER QL: NORMAL
HDLC SERPL-MCNC: 44 MG/DL
HGB BLD-MCNC: 13.7 G/DL (ref 11.5–15.4)
IMM GRANULOCYTES # BLD AUTO: 0.01 THOUSAND/UL (ref 0–0.2)
IMM GRANULOCYTES NFR BLD AUTO: 0 % (ref 0–2)
LDLC SERPL CALC-MCNC: 77 MG/DL (ref 0–100)
LYMPHOCYTES # BLD AUTO: 2.18 THOUSANDS/ΜL (ref 0.6–4.47)
LYMPHOCYTES NFR BLD AUTO: 52 % (ref 14–44)
MCH RBC QN AUTO: 30.7 PG (ref 26.8–34.3)
MCHC RBC AUTO-ENTMCNC: 33.3 G/DL (ref 31.4–37.4)
MCV RBC AUTO: 92 FL (ref 82–98)
MONOCYTES # BLD AUTO: 0.36 THOUSAND/ΜL (ref 0.17–1.22)
MONOCYTES NFR BLD AUTO: 9 % (ref 4–12)
NEUTROPHILS # BLD AUTO: 1.4 THOUSANDS/ΜL (ref 1.85–7.62)
NEUTS SEG NFR BLD AUTO: 34 % (ref 43–75)
NONHDLC SERPL-MCNC: 91 MG/DL
NRBC BLD AUTO-RTO: 0 /100 WBCS
PLATELET # BLD AUTO: 281 THOUSANDS/UL (ref 149–390)
PMV BLD AUTO: 10.1 FL (ref 8.9–12.7)
POTASSIUM SERPL-SCNC: 4.4 MMOL/L (ref 3.5–5.3)
PROT SERPL-MCNC: 7.7 G/DL (ref 6.4–8.4)
RBC # BLD AUTO: 4.46 MILLION/UL (ref 3.81–5.12)
SODIUM SERPL-SCNC: 137 MMOL/L (ref 135–147)
TRIGL SERPL-MCNC: 71 MG/DL
TSH SERPL DL<=0.05 MIU/L-ACNC: 2.03 UIU/ML (ref 0.45–4.5)
WBC # BLD AUTO: 4.13 THOUSAND/UL (ref 4.31–10.16)

## 2022-09-07 PROCEDURE — 85025 COMPLETE CBC W/AUTO DIFF WBC: CPT

## 2022-09-07 PROCEDURE — 80053 COMPREHEN METABOLIC PANEL: CPT

## 2022-09-07 PROCEDURE — 36415 COLL VENOUS BLD VENIPUNCTURE: CPT

## 2022-09-07 PROCEDURE — 80061 LIPID PANEL: CPT

## 2022-09-07 PROCEDURE — 86803 HEPATITIS C AB TEST: CPT

## 2022-09-07 PROCEDURE — 86140 C-REACTIVE PROTEIN: CPT

## 2022-09-07 PROCEDURE — 84443 ASSAY THYROID STIM HORMONE: CPT

## 2022-10-03 PROBLEM — Z01.419 ENCOUNTER FOR GYNECOLOGICAL EXAMINATION (GENERAL) (ROUTINE) WITHOUT ABNORMAL FINDINGS: Status: RESOLVED | Noted: 2019-05-15 | Resolved: 2022-10-03

## 2022-10-03 PROBLEM — Z00.00 ANNUAL PHYSICAL EXAM: Status: RESOLVED | Noted: 2022-03-10 | Resolved: 2022-10-03

## 2022-10-24 ENCOUNTER — ANNUAL EXAM (OUTPATIENT)
Dept: OBGYN CLINIC | Facility: CLINIC | Age: 27
End: 2022-10-24

## 2022-10-24 VITALS
BODY MASS INDEX: 29.59 KG/M2 | HEIGHT: 63 IN | WEIGHT: 167 LBS | DIASTOLIC BLOOD PRESSURE: 66 MMHG | SYSTOLIC BLOOD PRESSURE: 124 MMHG

## 2022-10-24 DIAGNOSIS — N92.6 IRREGULAR BLEEDING: ICD-10-CM

## 2022-10-24 DIAGNOSIS — Z11.3 SCREENING EXAMINATION FOR STD (SEXUALLY TRANSMITTED DISEASE): ICD-10-CM

## 2022-10-24 DIAGNOSIS — Z01.419 ENCOUNTER FOR GYNECOLOGICAL EXAMINATION WITHOUT ABNORMAL FINDING: Primary | ICD-10-CM

## 2022-10-24 PROBLEM — Z28.21 INFLUENZA VACCINE REFUSED: Status: RESOLVED | Noted: 2022-03-10 | Resolved: 2022-10-24

## 2022-10-24 PROCEDURE — 87491 CHLMYD TRACH DNA AMP PROBE: CPT | Performed by: PHYSICIAN ASSISTANT

## 2022-10-24 PROCEDURE — 87591 N.GONORRHOEAE DNA AMP PROB: CPT | Performed by: PHYSICIAN ASSISTANT

## 2022-10-24 PROCEDURE — G0476 HPV COMBO ASSAY CA SCREEN: HCPCS | Performed by: PHYSICIAN ASSISTANT

## 2022-10-24 RX ORDER — NORGESTREL AND ETHINYL ESTRADIOL 0.3-0.03MG
1 KIT ORAL DAILY
Qty: 28 TABLET | Refills: 11 | Status: SHIPPED | OUTPATIENT
Start: 2022-10-24

## 2022-10-24 NOTE — PROGRESS NOTES
Assessment/Plan:    No problem-specific Assessment & Plan notes found for this encounter  Diagnoses and all orders for this visit:    Encounter for gynecological examination without abnormal finding  -     Liquid-based pap, screening    Screening examination for STD (sexually transmitted disease)  -     Chlamydia/GC amplified DNA by PCR    Irregular bleeding  -     norgestrel-ethinyl estradiol (Low-Ogestrel) 0 3 mg-30 mcg per tablet; Take 1 tablet by mouth daily        Pap and GC/chlamydia screening done  We will call with STD testing results  Refills of OCP sent to pharmacy  Call if pelvic pain develops  If no problems, patient to return in 1 year for routine gyn care  Subjective:      Patient ID: Tiny Linton is a 24 y o  female  Patient is here for annual exam   She is new to our office today  States she is doing well  No complaints on her OCP  Periods are regular every 28 days, and bleeding lasts for 4 days  She denies any heavy bleeding, severe cramping, headache, and mood symptoms  Requests refills today  She is sexually active with a monogamous partner; they are using condoms  Requests STD screening  Patient denies any change in bowel/bladder habits, pelvic pain, non-food related bloating, abdominal pain, n/v, change in appetite, and thyroid disease  No history of abnormal Paps  States she has a family history of ovarian cysts  Patient is not performing self-breast exam   Denies new masses, skin changes, nipple discharge, and pain/tenderness  Maternal great-grandmother had breast cancer in her 46s; father had prostate cancer  The following portions of the patient's history were reviewed and updated as appropriate: allergies, current medications, past family history, past medical history, past social history, past surgical history and problem list     Review of Systems   Constitutional: Negative for appetite change and unexpected weight change     Cardiovascular:        No masses, skin changes, nipple discharge, and pain/tenderness  Gastrointestinal: Negative for abdominal distention, abdominal pain, constipation, diarrhea, nausea and vomiting  Genitourinary: Negative for difficulty urinating, dysuria, frequency, genital sores, hematuria, menstrual problem, pelvic pain, urgency, vaginal bleeding, vaginal discharge and vaginal pain  Objective:      /66 (BP Location: Left arm, Patient Position: Sitting, Cuff Size: Adult)   Ht 5' 3" (1 6 m)   Wt 75 8 kg (167 lb)   LMP 10/11/2022 (Exact Date)   BMI 29 58 kg/m²          Physical Exam  Vitals reviewed  Exam conducted with a chaperone present  Constitutional:       Appearance: Normal appearance  She is well-developed  Neck:      Thyroid: No thyromegaly  Pulmonary:      Effort: Pulmonary effort is normal    Chest:   Breasts: Breasts are symmetrical       Right: Normal  No swelling, bleeding, inverted nipple, mass, nipple discharge, skin change, tenderness, axillary adenopathy or supraclavicular adenopathy  Left: Normal  No swelling, bleeding, inverted nipple, mass, nipple discharge, skin change, tenderness, axillary adenopathy or supraclavicular adenopathy  Abdominal:      General: Abdomen is flat  There is no distension  Palpations: Abdomen is soft  Tenderness: There is no abdominal tenderness  Genitourinary:     General: Normal vulva  Pubic Area: No rash  Labia:         Right: No rash, tenderness, lesion or injury  Left: No rash, tenderness, lesion or injury  Vagina: Normal  No vaginal discharge, erythema, tenderness or bleeding  Cervix: Normal       Uterus: Normal        Adnexa: Right adnexa normal and left adnexa normal         Right: No mass, tenderness or fullness  Left: No mass, tenderness or fullness  Musculoskeletal:      Cervical back: Neck supple  Lymphadenopathy:      Cervical: No cervical adenopathy        Upper Body:      Right upper body: No supraclavicular or axillary adenopathy  Left upper body: No supraclavicular or axillary adenopathy  Lower Body: No right inguinal adenopathy  No left inguinal adenopathy  Skin:     General: Skin is warm and dry  Neurological:      Mental Status: She is alert and oriented to person, place, and time  Psychiatric:         Mood and Affect: Mood normal          Behavior: Behavior normal  Behavior is cooperative  Thought Content:  Thought content normal          Judgment: Judgment normal

## 2022-10-25 LAB
C TRACH DNA SPEC QL NAA+PROBE: NEGATIVE
N GONORRHOEA DNA SPEC QL NAA+PROBE: NEGATIVE

## 2022-10-26 LAB
HPV HR 12 DNA CVX QL NAA+PROBE: NEGATIVE
HPV16 DNA CVX QL NAA+PROBE: NEGATIVE
HPV18 DNA CVX QL NAA+PROBE: NEGATIVE

## 2022-10-31 LAB
LAB AP GYN PRIMARY INTERPRETATION: NORMAL
Lab: NORMAL

## 2023-03-21 ENCOUNTER — OFFICE VISIT (OUTPATIENT)
Dept: FAMILY MEDICINE CLINIC | Facility: CLINIC | Age: 28
End: 2023-03-21

## 2023-03-21 VITALS
BODY MASS INDEX: 30.65 KG/M2 | HEART RATE: 60 BPM | HEIGHT: 63 IN | RESPIRATION RATE: 18 BRPM | SYSTOLIC BLOOD PRESSURE: 124 MMHG | OXYGEN SATURATION: 99 % | DIASTOLIC BLOOD PRESSURE: 74 MMHG | TEMPERATURE: 97.7 F | WEIGHT: 173 LBS

## 2023-03-21 DIAGNOSIS — Z11.4 SCREENING FOR HIV (HUMAN IMMUNODEFICIENCY VIRUS): ICD-10-CM

## 2023-03-21 DIAGNOSIS — E66.09 CLASS 1 OBESITY DUE TO EXCESS CALORIES WITHOUT SERIOUS COMORBIDITY WITH BODY MASS INDEX (BMI) OF 30.0 TO 30.9 IN ADULT: ICD-10-CM

## 2023-03-21 DIAGNOSIS — R63.5 ABNORMAL WEIGHT GAIN: ICD-10-CM

## 2023-03-21 DIAGNOSIS — Z00.00 ANNUAL PHYSICAL EXAM: Primary | ICD-10-CM

## 2023-03-21 DIAGNOSIS — N92.6 IRREGULAR PERIODS: ICD-10-CM

## 2023-03-21 DIAGNOSIS — Z13.220 ENCOUNTER FOR LIPID SCREENING FOR CARDIOVASCULAR DISEASE: ICD-10-CM

## 2023-03-21 DIAGNOSIS — Z13.6 ENCOUNTER FOR LIPID SCREENING FOR CARDIOVASCULAR DISEASE: ICD-10-CM

## 2023-03-21 NOTE — PROGRESS NOTES
[facility]    NAME: Blessing Hernández  AGE: 24 y o  SEX: female  : 1995     DATE: 3/21/2023     Assessment and Plan:     Problem List Items Addressed This Visit    None  Visit Diagnoses     Annual physical exam    -  Primary    Relevant Orders    Insulin, fasting    Hemoglobin A1C    CBC and differential    Comprehensive metabolic panel    TSH, 3rd generation with Free T4 reflex    Lipid panel    Screening for HIV (human immunodeficiency virus)        Relevant Orders    HIV 1/2 AG/AB w Reflex [facility] for 2 yr old and above    Abnormal weight gain        Encounter for lipid screening for cardiovascular disease        Relevant Orders    Insulin, fasting    Cortisol Level, AM Specimen    Irregular periods        Relevant Orders    Insulin, fasting    Cortisol Level, AM Specimen    Testosterone, free, total    Class 1 obesity due to excess calories without serious comorbidity with body mass index (BMI) of 30 0 to 30 9 in adult            Based on history she started oral contraceptive pills in  when she weighed 139 pounds  Since then she has been watching her diet and exercising on a regular basis and has been gaining weight to 173 pounds today  I do think that it is possible that she has gained this weight for years due to oral contraceptive pills versus hormonal issues  Most recent labs were done in September and were within normal limits  I will order insulin levels, cortisol levels and testosterone levels given her irregular  It is possible she has underlying PCOS  If she continues to gain weight will refer to endocrinology  Rule out diabetes as well as thyroid dysfunction  Immunizations and preventive care screenings were discussed with patient today  Appropriate education was printed on patient's after visit summary      Counseling:  Alcohol/drug use: discussed moderation in alcohol intake, the recommendations for healthy alcohol use, and avoidance of illicit drug use  Dental Health: discussed importance of regular tooth brushing, flossing, and dental visits  Injury prevention: discussed safety/seat belts, safety helmets, smoke detectors, carbon dioxide detectors, and smoking near bedding or upholstery  · Exercise: the importance of regular exercise/physical activity was discussed  Recommend exercise 3-5 times per week for at least 30 minutes  BMI Counseling: Body mass index is 30 65 kg/m²  The BMI is above normal  Nutrition recommendations include decreasing portion sizes, encouraging healthy choices of fruits and vegetables, decreasing fast food intake, consuming healthier snacks, limiting drinks that contain sugar, moderation in carbohydrate intake and reducing intake of cholesterol  Exercise recommendations include moderate physical activity 150 minutes/week  No pharmacotherapy was ordered  Rationale for BMI follow-up plan is due to patient being overweight or obese  She tracks her calories and eats less than 1500 tony a day and does [employer] daily    Depression Screening and Follow-up Plan: Patient was screened for depression during today's encounter  They screened negative with a PHQ-2 score of 2  No follow-ups on file  Chief Complaint:     Chief Complaint   Patient presents with   • Establish Care   • Physical Exam   • concerned with thryoid      History of Present Illness:     Adult Annual Physical   Patient here for a comprehensive physical exam  The patient reports problems - Has been gaining weight and mother is concerned that she has thyroid dysfunction       Diet and Physical Activity  · Diet/Nutrition: well balanced diet, limited junk food, low carb diet and consuming 3-5 servings of fruits/vegetables daily  · Exercise: vigorous cardiovascular exercise        Depression Screening  PHQ-2/9 Depression Screening    Little interest or pleasure in doing things: 1 - several days  Feeling down, depressed, or hopeless: 1 - several days  PHQ-2 Score: 2  PHQ-2 Interpretation: Negative depression screen       General Health  · Sleep: sleeps well  · Hearing: grossly normal   · Vision: goes for regular eye exams  · Dental: regular dental visits  /GYN Health  · Last menstrual period: Patient's last menstrual period was 02/01/2023  ·   · Contraceptive method: oral contraceptives  · History of STDs?: no      Review of Systems:     Review of Systems   Constitutional: Positive for unexpected weight change (Has gained close to 40 pounds over the last 8 years)  Negative for fatigue and fever  HENT: Negative for congestion, facial swelling, mouth sores, rhinorrhea, sore throat and trouble swallowing  Eyes: Negative for pain and redness  Respiratory: Negative for cough, shortness of breath and wheezing  Cardiovascular: Negative for chest pain, palpitations and leg swelling  Gastrointestinal: Negative for abdominal pain, blood in stool, constipation, diarrhea and nausea  Genitourinary: Negative for dysuria, hematuria and urgency  Musculoskeletal: Negative for arthralgias, back pain and myalgias  Skin: Negative for rash and wound  Neurological: Negative for seizures, syncope and headaches  Hematological: Negative for adenopathy  Psychiatric/Behavioral: Negative for agitation and behavioral problems  Past Medical History:     Past Medical History:   Diagnosis Date   • Asthma       Past Surgical History:     Past Surgical History:   Procedure Laterality Date   • TONSILLECTOMY        Social History:     Social History     Socioeconomic History   • Marital status: Single     Spouse name: None   • Number of children: None   • Years of education: None   • Highest education level: None   Occupational History   • None   Tobacco Use   • Smoking status: Never   • Smokeless tobacco: Never   Vaping Use   • Vaping Use: Never used   Substance and Sexual Activity   • Alcohol use:  Yes Comment: occ   • Drug use: Never   • Sexual activity: Yes     Partners: Male     Birth control/protection: OCP   Other Topics Concern   • None   Social History Narrative   • None     Social Determinants of Health     Financial Resource Strain: Not on file   Food Insecurity: Not on file   Transportation Needs: Not on file   Physical Activity: Not on file   Stress: Not on file   Social Connections: Not on file   Intimate Partner Violence: Not on file   Housing Stability: Not on file      Family History:     Family History   Problem Relation Age of Onset   • Cancer Mother         Skin cancer   • Prostate cancer Father    • Cancer Father         Prostate cancer and bone cancer   • Ovarian cysts Sister    • Cancer Sister         Pre cancerous skin cancer   • Dementia Maternal Grandmother    • Cancer Maternal Grandmother         Great grandmother-breast cancer/thyroid cancer   • Colon cancer Maternal Grandmother    • Cancer Maternal Grandfather         Prostate cancer   • Colon cancer Maternal Grandfather    • Colon cancer Paternal Grandmother    • Colon cancer Paternal Grandfather       Current Medications:     Current Outpatient Medications   Medication Sig Dispense Refill   • Low-Ogestrel 0 3-30 MG-MCG per tablet TAKE 1 TABLET BY MOUTH EVERY DAY 84 tablet 3     No current facility-administered medications for this visit  Allergies:     No Known Allergies   Physical Exam:     /74 (BP Location: Left arm, Patient Position: Sitting, Cuff Size: Adult)   Pulse 60   Temp 97 7 °F (36 5 °C) (Tympanic)   Resp 18   Ht 5' 3" (1 6 m)   Wt 78 5 kg (173 lb)   LMP 02/01/2023   SpO2 99%   BMI 30 65 kg/m²     Physical Exam  Vitals and nursing note reviewed  Constitutional:       Appearance: She is well-developed  She is obese  HENT:      Head: Normocephalic and atraumatic        Right Ear: Tympanic membrane, ear canal and external ear normal       Left Ear: Tympanic membrane, ear canal and external ear normal  Nose: Nose normal       Mouth/Throat:      Pharynx: No oropharyngeal exudate  Eyes:      General: No scleral icterus  Right eye: No discharge  Left eye: No discharge  Conjunctiva/sclera: Conjunctivae normal       Pupils: Pupils are equal, round, and reactive to light  Neck:      Thyroid: No thyromegaly  Cardiovascular:      Rate and Rhythm: Normal rate and regular rhythm  Heart sounds: No murmur heard  No gallop  Pulmonary:      Effort: Pulmonary effort is normal  No respiratory distress  Breath sounds: Normal breath sounds  No wheezing or rales  Abdominal:      Palpations: Abdomen is soft  Tenderness: There is no abdominal tenderness  Musculoskeletal:         General: No tenderness or deformity  Cervical back: Normal range of motion  Right lower leg: No edema  Left lower leg: No edema  Lymphadenopathy:      Cervical: No cervical adenopathy  Skin:     General: Skin is warm  Capillary Refill: Capillary refill takes less than 2 seconds  Findings: No erythema or rash  Neurological:      Mental Status: She is alert and oriented to person, place, and time  Deep Tendon Reflexes: Reflexes normal    Psychiatric:         Behavior: Behavior normal          Thought Content:  Thought content normal          Judgment: Judgment normal           Lauren Andres MD   [address] [place] [address]

## 2023-04-03 ENCOUNTER — OFFICE VISIT (OUTPATIENT)
Dept: OBGYN CLINIC | Facility: CLINIC | Age: 28
End: 2023-04-03

## 2023-04-03 VITALS
HEIGHT: 63 IN | SYSTOLIC BLOOD PRESSURE: 146 MMHG | BODY MASS INDEX: 30.3 KG/M2 | WEIGHT: 171 LBS | DIASTOLIC BLOOD PRESSURE: 72 MMHG

## 2023-04-03 DIAGNOSIS — N92.3 INTERMENSTRUAL BLEEDING: ICD-10-CM

## 2023-04-03 DIAGNOSIS — Z30.41 ENCOUNTER FOR SURVEILLANCE OF CONTRACEPTIVE PILLS: Primary | ICD-10-CM

## 2023-04-03 RX ORDER — NORETHINDRONE AND ETHINYL ESTRADIOL AND FERROUS FUMARATE 0.8-25(24)
1 KIT ORAL DAILY
Qty: 28 TABLET | Refills: 3 | Status: SHIPPED | OUTPATIENT
Start: 2023-04-03

## 2023-04-03 NOTE — PATIENT INSTRUCTIONS
Rx for Generess x 3 refills sent to pharmacy  Finish current pill pack, then start new medication  Take pill every day at the same time; do not skip doses  F/u with PCP for elevated BP reading  Call with problems

## 2023-04-03 NOTE — PROGRESS NOTES
Assessment/Plan:    No problem-specific Assessment & Plan notes found for this encounter  Diagnoses and all orders for this visit:    Encounter for surveillance of contraceptive pills    Intermenstrual bleeding  -     Norethin-Eth Estradiol-Fe 0 8-25 MG-MCG CHEW; Chew 1 tablet in the morning        Discussed OCP with patient  Bleeding in February could have been due to many factors and may not occur again  Patient would still like to switch OCP  Rx for Generess x 3 refills sent to pharmacy  Finish current pill pack, then start new medication  Continue to take pill every day at the same time; do not skip doses  May or may not help with weight issue  Patient to f/u with PCP for elevated BP reading  F/u in 3 months for recheck; call with problems in the interim  Subjective:      Patient ID: Armando Álvarez is a 24 y o  female  Patient is here to discuss her OCP  States she had bleeding/spotting for the entire month of February, then it stopped  She did not miss any doses or take any late  Period on 4/1 came during placebo week of pill pack  States her PCP has concern that OCP is causing weight gain  Patient states she has been on an OCP since age 15 for menorrhagia  Patient denies bowel/bladder changes, pelvic pain, bloating, abdominal pain, n/v, change in appetite, HA, and mood symptoms  Would like to switch to another OCP  The following portions of the patient's history were reviewed and updated as appropriate: allergies, current medications, past family history, past medical history, past social history, past surgical history and problem list     Review of Systems   Constitutional: Positive for unexpected weight change  Negative for appetite change  Gastrointestinal: Negative for abdominal distention, abdominal pain, constipation, diarrhea, nausea and vomiting  Genitourinary: Positive for menstrual problem (Intermenstrual bleeding)   Negative for difficulty urinating, dysuria, "frequency, genital sores, hematuria, pelvic pain, urgency, vaginal bleeding, vaginal discharge and vaginal pain  Neurological: Negative for headaches  Objective:      /72 (BP Location: Left arm, Patient Position: Sitting, Cuff Size: Adult)   Ht 5' 3\" (1 6 m)   Wt 77 6 kg (171 lb)   LMP 04/01/2023 (Exact Date)   BMI 30 29 kg/m²          Physical Exam  Vitals reviewed  Constitutional:       Appearance: Normal appearance  She is well-developed  Neurological:      Mental Status: She is alert and oriented to person, place, and time  Psychiatric:         Mood and Affect: Mood normal          Behavior: Behavior normal  Behavior is cooperative  Thought Content:  Thought content normal          Judgment: Judgment normal          "

## 2023-04-08 LAB
ALBUMIN SERPL-MCNC: 4.6 G/DL (ref 3.6–5.1)
ALBUMIN/GLOB SERPL: 1.5 (CALC) (ref 1–2.5)
ALP SERPL-CCNC: 65 U/L (ref 31–125)
ALT SERPL-CCNC: 16 U/L (ref 6–29)
AST SERPL-CCNC: 23 U/L (ref 10–30)
BASOPHILS # BLD AUTO: 22 CELLS/UL (ref 0–200)
BASOPHILS NFR BLD AUTO: 0.5 %
BILIRUB SERPL-MCNC: 0.7 MG/DL (ref 0.2–1.2)
BUN SERPL-MCNC: 22 MG/DL (ref 7–25)
BUN/CREAT SERPL: NORMAL (CALC) (ref 6–22)
CALCIUM SERPL-MCNC: 10 MG/DL (ref 8.6–10.2)
CHLORIDE SERPL-SCNC: 103 MMOL/L (ref 98–110)
CHOLEST SERPL-MCNC: 174 MG/DL
CHOLEST/HDLC SERPL: 3.3 (CALC)
CO2 SERPL-SCNC: 26 MMOL/L (ref 20–32)
CORTIS AM PEAK SERPL-MCNC: 24.9 MCG/DL
CREAT SERPL-MCNC: 0.82 MG/DL (ref 0.5–0.96)
EOSINOPHIL # BLD AUTO: 141 CELLS/UL (ref 15–500)
EOSINOPHIL NFR BLD AUTO: 3.2 %
ERYTHROCYTE [DISTWIDTH] IN BLOOD BY AUTOMATED COUNT: 11.7 % (ref 11–15)
GFR/BSA.PRED SERPLBLD CYS-BASED-ARV: 101 ML/MIN/1.73M2
GLOBULIN SER CALC-MCNC: 3 G/DL (CALC) (ref 1.9–3.7)
GLUCOSE SERPL-MCNC: 84 MG/DL (ref 65–99)
HBA1C MFR BLD: 4.8 % OF TOTAL HGB
HCT VFR BLD AUTO: 42.9 % (ref 35–45)
HDLC SERPL-MCNC: 52 MG/DL
HGB BLD-MCNC: 14.2 G/DL (ref 11.7–15.5)
HIV 1+2 AB+HIV1 P24 AG SERPL QL IA: NORMAL
INSULIN SERPL-ACNC: 6.7 UIU/ML
LDLC SERPL CALC-MCNC: 107 MG/DL (CALC)
LYMPHOCYTES # BLD AUTO: 2583 CELLS/UL (ref 850–3900)
LYMPHOCYTES NFR BLD AUTO: 58.7 %
MCH RBC QN AUTO: 30 PG (ref 27–33)
MCHC RBC AUTO-ENTMCNC: 33.1 G/DL (ref 32–36)
MCV RBC AUTO: 90.7 FL (ref 80–100)
MONOCYTES # BLD AUTO: 374 CELLS/UL (ref 200–950)
MONOCYTES NFR BLD AUTO: 8.5 %
NEUTROPHILS # BLD AUTO: 1280 CELLS/UL (ref 1500–7800)
NEUTROPHILS NFR BLD AUTO: 29.1 %
NONHDLC SERPL-MCNC: 122 MG/DL (CALC)
PLATELET # BLD AUTO: 316 THOUSAND/UL (ref 140–400)
PMV BLD REES-ECKER: 9.5 FL (ref 7.5–12.5)
POTASSIUM SERPL-SCNC: 4.1 MMOL/L (ref 3.5–5.3)
PROT SERPL-MCNC: 7.6 G/DL (ref 6.1–8.1)
RBC # BLD AUTO: 4.73 MILLION/UL (ref 3.8–5.1)
SODIUM SERPL-SCNC: 138 MMOL/L (ref 135–146)
TESTOST FREE SERPL-MCNC: 3.1 PG/ML (ref 0.1–6.4)
TESTOST SERPL-MCNC: 38 NG/DL (ref 2–45)
TRIGL SERPL-MCNC: 67 MG/DL
TSH SERPL-ACNC: 2.15 MIU/L
WBC # BLD AUTO: 4.4 THOUSAND/UL (ref 3.8–10.8)

## 2023-05-23 ENCOUNTER — CONSULT (OUTPATIENT)
Dept: ENDOCRINOLOGY | Facility: CLINIC | Age: 28
End: 2023-05-23

## 2023-05-23 VITALS
WEIGHT: 172 LBS | SYSTOLIC BLOOD PRESSURE: 132 MMHG | DIASTOLIC BLOOD PRESSURE: 88 MMHG | BODY MASS INDEX: 30.48 KG/M2 | HEART RATE: 63 BPM | TEMPERATURE: 98.4 F | OXYGEN SATURATION: 99 % | HEIGHT: 63 IN

## 2023-05-23 DIAGNOSIS — R53.83 OTHER FATIGUE: ICD-10-CM

## 2023-05-23 DIAGNOSIS — E28.2 PCOS (POLYCYSTIC OVARIAN SYNDROME): ICD-10-CM

## 2023-05-23 DIAGNOSIS — R79.89 ELEVATED MORNING SERUM CORTISOL LEVEL: Primary | ICD-10-CM

## 2023-05-23 RX ORDER — DEXAMETHASONE 1 MG
TABLET ORAL
Qty: 1 TABLET | Refills: 0 | Status: SHIPPED | OUTPATIENT
Start: 2023-05-23

## 2023-05-23 NOTE — PROGRESS NOTES
Follow-up Patient Progress Note      CC: elevated cortisol    History of Present Illness:   34 yr generally healthy female with Hx irregular menstruation was referred for elevated a m  cortisol  She does not report any symptoms except some fatigue, waking up tired  No hirsutism  She is active and medical fitness training includes HIIT training  She typically takes 1500 tony/day  Menstrual Hx: menarche age 19yr  Weight 120 lbs  Always irregular and menorrhagia - started COCP age 27  She continues to have some irregular menstruation in  Spite of COCP  Follows GYN  Max adult weight: 170 lbs  Min adult weight: 135 lbs age 23  FH: diabetes - MA, MU, mother prediabetes  PCOS - sister, aunt    Patient Active Problem List   Diagnosis   • Chest pain on breathing   • COVID-19 vaccine series completed   • Costochondritis     Past Medical History:   Diagnosis Date   • Asthma       Past Surgical History:   Procedure Laterality Date   • TONSILLECTOMY        Family History   Problem Relation Age of Onset   • Cancer Mother         Skin cancer   • Prostate cancer Father    • Cancer Father         Prostate cancer   • Ovarian cysts Sister    • Cancer Sister         Pre cancerous skin cancer   • Dementia Maternal Grandmother    • Cancer Maternal Grandmother         Great grandmother-breast cancer/thyroid cancer   • Colon cancer Maternal Grandmother    • Cancer Maternal Grandfather         Prostate cancer   • Colon cancer Maternal Grandfather    • Colon cancer Paternal Grandmother    • Colon cancer Paternal Grandfather      Social History     Tobacco Use   • Smoking status: Never   • Smokeless tobacco: Never   Substance Use Topics   • Alcohol use: Yes     Comment: occ     No Known Allergies      Current Outpatient Medications:   •  Kaitlib Fe 0 8-25 MG-MCG CHEW, CHEW 1 TABLET IN THE MORNING, Disp: 84 tablet, Rfl: 0    Review of Systems   HENT: Negative  Eyes: Negative  Respiratory: Negative      Cardiovascular: "Negative  Gastrointestinal: Negative  Endocrine: Negative  Musculoskeletal: Negative  Skin: Negative  Allergic/Immunologic: Negative  Neurological: Negative  Hematological: Negative  Psychiatric/Behavioral: Negative  Physical Exam:  Body mass index is 30 47 kg/m²  /88 (BP Location: Left arm, Patient Position: Sitting, Cuff Size: Large)   Pulse 63   Temp 98 4 °F (36 9 °C) (Tympanic)   Ht 5' 3\" (1 6 m)   Wt 78 kg (172 lb)   SpO2 99%   BMI 30 47 kg/m²    Vitals:    05/23/23 0913   Weight: 78 kg (172 lb)        Physical Exam  Constitutional:       General: She is not in acute distress  Appearance: She is well-developed  She is not ill-appearing  HENT:      Head: Normocephalic and atraumatic  Nose: Nose normal       Mouth/Throat:      Pharynx: Oropharynx is clear  Eyes:      Extraocular Movements: Extraocular movements intact  Conjunctiva/sclera: Conjunctivae normal    Neck:      Thyroid: No thyromegaly  Cardiovascular:      Rate and Rhythm: Normal rate  Pulmonary:      Effort: Pulmonary effort is normal    Musculoskeletal:         General: No deformity  Cervical back: Normal range of motion  Skin:     Capillary Refill: Capillary refill takes less than 2 seconds  Coloration: Skin is not pale  Findings: No rash  Neurological:      Mental Status: She is alert and oriented to person, place, and time     Psychiatric:         Behavior: Behavior normal          Labs:   Lab Results   Component Value Date    HGBA1C 4 8 04/04/2023       Lab Results   Component Value Date    [id number] 2 030 09/07/2022       Lab Results   Component Value Date    CREATININE 0 82 04/04/2023    CREATININE 0 91 09/07/2022    CREATININE 0 81 12/28/2019    BUN 22 04/04/2023    K 4 1 04/04/2023     04/04/2023    CO2 26 04/04/2023     eGFR   Date Value Ref Range Status   04/04/2023 101 > OR = 60 mL/min/1 73m2 Final     Comment:     The eGFR is based on the CKD-EPI " 2021 equation  To calculate   the new eGFR from a previous Creatinine or Cystatin C  result, go to [facility] at  [email or web address]  [email or web address]     09/07/2022 87 ml/min/1 73sq m Final       Lab Results   Component Value Date    ALT 16 04/04/2023    AST 23 04/04/2023    ALKPHOS 65 04/04/2023       Lab Results   Component Value Date    CHOLESTEROL 174 04/04/2023    CHOLESTEROL 135 09/07/2022     Lab Results   Component Value Date    HDL 52 04/04/2023    HDL 44 (L) 09/07/2022     Lab Results   Component Value Date    TRIG 67 04/04/2023    TRIG 71 09/07/2022     Lab Results   Component Value Date    [place] 91 09/07/2022         Impression:  1  Elevated morning serum cortisol level    2  PCOS (polycystic ovarian syndrome)    3  Other fatigue         Plan:    Diagnoses and all orders for this visit:    Elevated morning serum cortisol level  This may be physiological or pathological   She was quite nervous for the blood tests that can be a stress response  Today we discussed options and will rule out Cushing syndrome with 1 mg dexamethasone suppression test   Since she has slightly higher testosterone levels in spite of using combined oral contraceptive pills, will rule out adrenal overproduction of testosterone for which differentials would include nonclassical CAH  We will follow-up with repeat labs as listed below in 4 to 6 weeks  -     Ambulatory Referral to Endocrinology  -     DHEA-sulfate; Future  -     Cortisol Level, AM Specimen; Future  -     dexamethasone (DECADRON) 1 mg tablet; Take 1 tab at 11pm and get cortisol levels checked between 7:00 a m  and 9:00 a m  on the next morning   -     Androstenedione    PCOS (polycystic ovarian syndrome)  This is suspected but not diagnosed  She has slightly elevated testosterone for use of COCP  She however does not have any features of hirsutism or established anovulatory cycles/polycystic ovaries  She follows with GYN  Other fatigue  Reports waking up tired and some other features of fatigue during different parts of the day  We will rule out common metabolic causes as listed below  -     Ambulatory referral to Sleep Medicine; Future  -     Vitamin D 25 hydroxy; Future  -     Iron Panel (Includes Ferritin, Iron Sat%, Iron, and TIBC); Future        I have spent 45 minutes with patient today in which greater than 50% of this time was spent in counseling/coordination of care  Discussed with the patient and all questioned fully answered  She will call me if any problems arise  Educated/ Counseled patient on diagnostic test results, prognosis, risk vs benefit of treatment options, importance of treatment compliance, healthy life and lifestyle choices        [address]

## 2023-07-06 ENCOUNTER — OFFICE VISIT (OUTPATIENT)
Dept: OBGYN CLINIC | Facility: CLINIC | Age: 28
End: 2023-07-06
Payer: COMMERCIAL

## 2023-07-06 VITALS
WEIGHT: 171 LBS | SYSTOLIC BLOOD PRESSURE: 118 MMHG | HEIGHT: 63 IN | BODY MASS INDEX: 30.3 KG/M2 | DIASTOLIC BLOOD PRESSURE: 82 MMHG

## 2023-07-06 DIAGNOSIS — N92.3 INTERMENSTRUAL BLEEDING: ICD-10-CM

## 2023-07-06 DIAGNOSIS — Z30.41 ENCOUNTER FOR SURVEILLANCE OF CONTRACEPTIVE PILLS: Primary | ICD-10-CM

## 2023-07-06 PROCEDURE — 99213 OFFICE O/P EST LOW 20 MIN: CPT | Performed by: PHYSICIAN ASSISTANT

## 2023-07-06 RX ORDER — NORETHINDRONE AND ETHINYL ESTRADIOL AND FERROUS FUMARATE 0.8-25(24)
1 KIT ORAL DAILY
Qty: 84 TABLET | Refills: 0 | Status: SHIPPED | OUTPATIENT
Start: 2023-07-06

## 2023-07-06 NOTE — PATIENT INSTRUCTIONS
Refills of OCP sent to pharmacy. Continue to take pill every day. F/u with endocrinology as planned. Call with problems.

## 2023-07-06 NOTE — PROGRESS NOTES
Assessment/Plan:    No problem-specific Assessment & Plan notes found for this encounter. Diagnoses and all orders for this visit:    Encounter for surveillance of contraceptive pills    Intermenstrual bleeding  -     Norethin-Eth Estradiol-Fe (Kaitlib Fe) 0.8-25 MG-MCG CHEW; Chew 1 tablet in the morning        Discussed OCP with patient. Continue to take pill every day at the same time. Refills sent to pharmacy. F/u with endocrinology as planned. F/u in October for yearly gyn exam.  Call with problems in the interim. Subjective:      Patient ID: Merlinda Ohs is a 34 y.o. female. Patient is here for birth control f/u. States she is doing well overall. Was started on Generess in April. Periods are regular every 28 days, and bleeding lasts for 3-4 days. Flow is light with no cramping. Does get HA and bloating. Currently followed by endocrinology for elevated morning cortisol and possible PCOS. Had elevated testosterone despite OCP. Going for dexamethasone suppression test on 7/10. Endocrinologist wants to r/o nonclassical CAH. Has f/u next week. Denies bowel/bladder changes, pelvic pain, abdominal pain, n/v, and change in appetite. Patient would like to remain on current OCP. The following portions of the patient's history were reviewed and updated as appropriate: allergies, current medications, past family history, past medical history, past social history, past surgical history and problem list.    Review of Systems   Constitutional: Negative for appetite change and unexpected weight change. Gastrointestinal: Positive for abdominal distention (Menstrual). Negative for abdominal pain, constipation, diarrhea, nausea and vomiting. Genitourinary: Negative for difficulty urinating, dysuria, frequency, genital sores, hematuria, menstrual problem, pelvic pain, urgency, vaginal bleeding, vaginal discharge and vaginal pain. Neurological: Positive for headaches (Menstrual). Objective:      /82 (BP Location: Left arm, Patient Position: Sitting, Cuff Size: Adult)   Ht 5' 3" (1.6 m)   Wt 77.6 kg (171 lb)   LMP 06/17/2023 (Approximate)   BMI 30.29 kg/m²          Physical Exam  Vitals reviewed. Constitutional:       Appearance: Normal appearance. She is well-developed. Neurological:      Mental Status: She is alert and oriented to person, place, and time. Psychiatric:         Mood and Affect: Mood normal.         Behavior: Behavior normal. Behavior is cooperative. Thought Content:  Thought content normal.         Judgment: Judgment normal.

## 2023-07-09 LAB
25(OH)D3 SERPL-MCNC: 37 NG/ML (ref 30–100)
CORTIS AM PEAK SERPL-MCNC: 0.9 MCG/DL
DHEA-S SERPL-MCNC: 104 MCG/DL (ref 14–349)
FERRITIN SERPL-MCNC: 38 NG/ML (ref 16–154)
IRON SATN MFR SERPL: 36 % (CALC) (ref 16–45)
IRON SERPL-MCNC: 160 MCG/DL (ref 40–190)
TIBC SERPL-MCNC: 445 MCG/DL (CALC) (ref 250–450)

## 2023-07-11 ENCOUNTER — OFFICE VISIT (OUTPATIENT)
Dept: ENDOCRINOLOGY | Facility: CLINIC | Age: 28
End: 2023-07-11
Payer: COMMERCIAL

## 2023-07-11 VITALS
DIASTOLIC BLOOD PRESSURE: 78 MMHG | WEIGHT: 170 LBS | BODY MASS INDEX: 30.12 KG/M2 | HEART RATE: 66 BPM | SYSTOLIC BLOOD PRESSURE: 120 MMHG | OXYGEN SATURATION: 98 % | HEIGHT: 63 IN

## 2023-07-11 DIAGNOSIS — E27.0 HYPERCORTISOLEMIA (HCC): Primary | ICD-10-CM

## 2023-07-11 DIAGNOSIS — E66.9 CLASS 1 OBESITY WITHOUT SERIOUS COMORBIDITY WITH BODY MASS INDEX (BMI) OF 30.0 TO 30.9 IN ADULT, UNSPECIFIED OBESITY TYPE: ICD-10-CM

## 2023-07-11 DIAGNOSIS — E55.9 VITAMIN D DEFICIENCY: ICD-10-CM

## 2023-07-11 PROCEDURE — 99214 OFFICE O/P EST MOD 30 MIN: CPT | Performed by: INTERNAL MEDICINE

## 2023-07-11 NOTE — PROGRESS NOTES
Follow-up Patient Progress Note      CC: elevated cortisol     History of Present Illness:   34 yr generally healthy female with Hx irregular menstruation was referred for elevated a.m. cortisol. Last visit was 5/23/23.     Since last visit, she lost 2 lbs. She does not report any symptoms except some fatigue, waking up tired. No hirsutism.     She is active and medical fitness training includes HIIT training. She typically takes 1500 tony/day.     Menstrual Hx: menarche age 19yr. Weight 120 lbs. Always irregular and menorrhagia - started COCP age 27. She continues to have some irregular menstruation in  Spite of COCP. Follows GYN.     Max adult weight: 170 lbs. Min adult weight: 135 lbs age 23.     FH: diabetes - MA, MU, mother prediabetes.  PCOS - sister, aunt    Patient Active Problem List   Diagnosis   • Chest pain on breathing   • COVID-19 vaccine series completed   • Costochondritis     Past Medical History:   Diagnosis Date   • Asthma       Past Surgical History:   Procedure Laterality Date   • TONSILLECTOMY        Family History   Problem Relation Age of Onset   • Cancer Mother         Skin cancer   • Prostate cancer Father    • Cancer Father         Prostate cancer   • Ovarian cysts Sister    • Cancer Sister         Pre cancerous skin cancer   • Dementia Maternal Grandmother    • Cancer Maternal Grandmother         Great grandmother-breast cancer/thyroid cancer   • Colon cancer Maternal Grandmother    • Cancer Maternal Grandfather         Prostate cancer   • Colon cancer Maternal Grandfather    • Colon cancer Paternal Grandmother    • Colon cancer Paternal Grandfather      Social History     Tobacco Use   • Smoking status: Never   • Smokeless tobacco: Never   Substance Use Topics   • Alcohol use: Yes     Comment: occ     No Known Allergies      Current Outpatient Medications:   •  Norethin-Eth Estradiol-Fe (Kaitlib Fe) 0.8-25 MG-MCG CHEW, Chew 1 tablet in the morning, Disp: 84 tablet, Rfl: 0  • dexamethasone (DECADRON) 1 mg tablet, Take 1 tab at 11pm and get cortisol levels checked between 7:00 a.m. and 9:00 a.m. on the next morning. (Patient not taking: Reported on 7/11/2023), Disp: 1 tablet, Rfl: 0    Review of Systems   HENT: Negative. Eyes: Negative. Respiratory: Negative. Cardiovascular: Negative. Gastrointestinal: Negative. Endocrine: Negative. Musculoskeletal: Negative. Skin: Negative. Allergic/Immunologic: Negative. Neurological: Negative. Hematological: Negative. Psychiatric/Behavioral: Negative. Physical Exam:  Body mass index is 30.11 kg/m². /78 (BP Location: Left arm, Patient Position: Sitting, Cuff Size: Adult)   Pulse 66   Ht 5' 3" (1.6 m)   Wt 77.1 kg (170 lb)   LMP 06/17/2023 (Approximate)   SpO2 98%   BMI 30.11 kg/m²    Vitals:    07/11/23 0901   Weight: 77.1 kg (170 lb)        Physical Exam  Constitutional:       General: She is not in acute distress. Appearance: She is well-developed. She is not ill-appearing. HENT:      Head: Normocephalic and atraumatic. Nose: Nose normal.      Mouth/Throat:      Pharynx: Oropharynx is clear. Eyes:      Extraocular Movements: Extraocular movements intact. Conjunctiva/sclera: Conjunctivae normal.   Neck:      Thyroid: No thyromegaly. Cardiovascular:      Rate and Rhythm: Normal rate. Pulmonary:      Effort: Pulmonary effort is normal.   Musculoskeletal:         General: No deformity. Cervical back: Normal range of motion. Skin:     Capillary Refill: Capillary refill takes less than 2 seconds. Coloration: Skin is not pale. Findings: No rash. Neurological:      Mental Status: She is alert and oriented to person, place, and time.    Psychiatric:         Behavior: Behavior normal.         Labs:   Lab Results   Component Value Date    HGBA1C 4.8 04/04/2023       Lab Results   Component Value Date    [id number] 2.030 09/07/2022       Lab Results   Component Value Date    CREATININE 0.82 04/04/2023    CREATININE 0.91 09/07/2022    CREATININE 0.81 12/28/2019    BUN 22 04/04/2023    K 4.1 04/04/2023     04/04/2023    CO2 26 04/04/2023     eGFR   Date Value Ref Range Status   04/04/2023 101 > OR = 60 mL/min/1.73m2 Final     Comment:     The eGFR is based on the CKD-EPI 2021 equation. To calculate   the new eGFR from a previous Creatinine or Cystatin C  result, go to [email or web address]  [email or web address]     09/07/2022 87 ml/min/1.73sq m Final       Lab Results   Component Value Date    ALT 16 04/04/2023    AST 23 04/04/2023    ALKPHOS 65 04/04/2023       Lab Results   Component Value Date    CHOLESTEROL 174 04/04/2023    CHOLESTEROL 135 09/07/2022     Lab Results   Component Value Date    HDL 52 04/04/2023    HDL 44 (L) 09/07/2022     Lab Results   Component Value Date    TRIG 67 04/04/2023    TRIG 71 09/07/2022     Lab Results   Component Value Date    [address] 91 09/07/2022         Impression:  1. Hypercortisolemia ([address])    2. Vitamin D deficiency    3. Class 1 obesity without serious comorbidity with body mass index (BMI) of 30.0 to 30.9 in adult, unspecified obesity type         Plan:    Diagnoses and all orders for this visit:    Hypercortisolemia ([address] She was concerned about an elevated a.m. cortisol level. Recent 1 mg DST was normal in spite of being on combined oral contraceptive pills. Today we discussed options and since there is a rare possibility of cyclical Cushing's, I recommended salivary cortisol in the next year or so for monitoring. Note however, she has low pretest probability. Follow-up in 1 year either with myself or PCP.    -     SALIVARY CORTISOL,THREE SPECIMENS; Future    Vitamin D deficiency. Advised vitamin D3 2000 IU daily OTC.  -     Vitamin D 25 hydroxy; Future    Class 1 obesity without serious comorbidity with body mass index (BMI) of 30.0 to 30.9 in adult, unspecified obesity type.   She is in HIIT training and is currently maintaining good muscle mass. The BMI is likely to be over-representing body fat. Her weight has been steady. I have spent 31 minutes with patient today in which greater than 50% of this time was spent in counseling/coordination of care. Discussed with the patient and all questioned fully answered. She will call me if any problems arise. Educated/ Counseled patient on diagnostic test results, prognosis, risk vs benefit of treatment options, importance of treatment compliance, healthy life and lifestyle choices.       Jamie

## 2023-07-20 ENCOUNTER — CONSULT (OUTPATIENT)
Dept: PULMONOLOGY | Facility: CLINIC | Age: 28
End: 2023-07-20
Payer: COMMERCIAL

## 2023-07-20 VITALS
BODY MASS INDEX: 30.83 KG/M2 | SYSTOLIC BLOOD PRESSURE: 124 MMHG | HEART RATE: 69 BPM | OXYGEN SATURATION: 98 % | DIASTOLIC BLOOD PRESSURE: 70 MMHG | WEIGHT: 174 LBS | HEIGHT: 63 IN

## 2023-07-20 DIAGNOSIS — E66.09 CLASS 1 OBESITY DUE TO EXCESS CALORIES WITHOUT SERIOUS COMORBIDITY WITH BODY MASS INDEX (BMI) OF 30.0 TO 30.9 IN ADULT: ICD-10-CM

## 2023-07-20 DIAGNOSIS — R53.83 OTHER FATIGUE: ICD-10-CM

## 2023-07-20 DIAGNOSIS — G47.33 OSA (OBSTRUCTIVE SLEEP APNEA): Primary | ICD-10-CM

## 2023-07-20 PROBLEM — E66.811 CLASS 1 OBESITY DUE TO EXCESS CALORIES WITHOUT SERIOUS COMORBIDITY WITH BODY MASS INDEX (BMI) OF 30.0 TO 30.9 IN ADULT: Status: ACTIVE | Noted: 2023-07-20

## 2023-07-20 PROCEDURE — 99244 OFF/OP CNSLTJ NEW/EST MOD 40: CPT | Performed by: INTERNAL MEDICINE

## 2023-07-20 NOTE — ASSESSMENT & PLAN NOTE
Ms. Danita perez has been having daytime sleepiness and tiredness and was diagnosed to have low cortisol. She is being seen by the endocrinologist and has been referred for evaluation for sleep breathing disorder. She has history of snoring and reported apneic events. She gets occasional morning headache. She has daytime sleepiness and tiredness. Her [place] sleepiness score is 8 out of 24. She has no problem driving. She does not wake up during sleep gasping for air. She has gained weight over the past 2 years. On clinical examination, she was obese and had oropharyngeal crowding. She needs an overnight sleep study to further evaluate. She could benefit from CPAP therapy. I had a long discussion with her and have answered all questions.

## 2023-07-20 NOTE — ASSESSMENT & PLAN NOTE
She is mildly obese and understands the need for weight reduction. She understands that weight reduction can significantly improve sleep apnea.

## 2023-07-20 NOTE — PROGRESS NOTES
Assessment/Plan:    URBAN (obstructive sleep apnea)  Ms. Norbert perez has been having daytime sleepiness and tiredness and was diagnosed to have low cortisol. She is being seen by the endocrinologist and has been referred for evaluation for sleep breathing disorder. She has history of snoring and reported apneic events. She gets occasional morning headache. She has daytime sleepiness and tiredness. Her [place] sleepiness score is 8 out of 24. She has no problem driving. She does not wake up during sleep gasping for air. She has gained weight over the past 2 years. On clinical examination, she was obese and had oropharyngeal crowding. She needs an overnight sleep study to further evaluate. She could benefit from CPAP therapy. I had a long discussion with her and have answered all questions. Class 1 obesity due to excess calories without serious comorbidity with body mass index (BMI) of 30.0 to 30.9 in adult  She is mildly obese and understands the need for weight reduction. She understands that weight reduction can significantly improve sleep apnea. Diagnoses and all orders for this visit:    URBAN (obstructive sleep apnea)  -     Home Study; Future    Class 1 obesity due to excess calories without serious comorbidity with body mass index (BMI) of 30.0 to 30.9 in adult    Other fatigue  -     Ambulatory referral to Sleep Medicine          Subjective:      Patient ID: Brooke Ngo is a 34 y.o. female. Ms. Norbert perez has been having daytime sleepiness and tiredness and was diagnosed to have low cortisol. He is being seen by the endocrinologist and has been referred for evaluation for sleep breathing disorder. She has history of snoring and reported apneic events. She gets occasional morning headache. She has daytime sleepiness and tiredness. Her [place] sleepiness score is 8 out of 24. She has no problem driving. She does not wake up during sleep gasping for air.   She has gained weight over the past 2 years. She is borderline obese. She previously used to get chest pain and has been diagnosed with costochondritis. She has occasional shortness of breath on exertion but denied any cough or phlegm or wheeze or chest pain. No swelling of feet. Her appetite has been normal.      The following portions of the patient's history were reviewed and updated as appropriate: allergies, current medications, past family history, past medical history, past social history, past surgical history and problem list.    Review of Systems   Constitutional: Positive for fatigue. Negative for appetite change, chills, fever and unexpected weight change. HENT: Negative for hearing loss, rhinorrhea, sneezing, sore throat, trouble swallowing and voice change. Eyes: Negative for visual disturbance. Respiratory: Positive for shortness of breath. Negative for cough, chest tightness and wheezing. Cardiovascular: Positive for chest pain. Negative for palpitations and leg swelling. Gastrointestinal: Positive for nausea. Negative for abdominal pain, constipation, diarrhea and vomiting. Genitourinary: Negative for dysuria, frequency and urgency. Musculoskeletal: Negative for arthralgias, gait problem and joint swelling. Skin: Positive for rash. Allergic/Immunologic: Negative for environmental allergies. Neurological: Positive for dizziness and headaches. Negative for seizures, syncope and light-headedness. Psychiatric/Behavioral: Positive for sleep disturbance. Negative for agitation and confusion. The patient is nervous/anxious. Objective:      /70 (BP Location: Right arm, Patient Position: Sitting, Cuff Size: Standard)   Pulse 69   Ht 5' 3" (1.6 m)   Wt 78.9 kg (174 lb)   LMP 06/17/2023 (Approximate)   SpO2 98%   BMI 30.82 kg/m²          Physical Exam  Vitals reviewed. Constitutional:       General: She is not in acute distress. Appearance: She is obese.  She is not ill-appearing, toxic-appearing or diaphoretic. HENT:      Head: Normocephalic. Mouth/Throat:      Mouth: Mucous membranes are moist.   Eyes:      General: No scleral icterus. Conjunctiva/sclera: Conjunctivae normal.   Cardiovascular:      Rate and Rhythm: Normal rate and regular rhythm. Heart sounds: Normal heart sounds. Pulmonary:      Effort: Pulmonary effort is normal. No respiratory distress. Breath sounds: Normal breath sounds. No stridor. No wheezing, rhonchi or rales. Chest:      Chest wall: No tenderness. Abdominal:      General: Bowel sounds are normal.      Palpations: Abdomen is soft. Tenderness: There is no abdominal tenderness. There is no guarding. Musculoskeletal:      Cervical back: No rigidity. Right lower leg: No edema. Left lower leg: No edema. Lymphadenopathy:      Cervical: No cervical adenopathy. Skin:     Coloration: Skin is not jaundiced or pale. Findings: No rash. Neurological:      Mental Status: She is alert and oriented to person, place, and time. Gait: Gait normal.   Psychiatric:         Mood and Affect: Mood normal.         Behavior: Behavior normal.         Thought Content:  Thought content normal.         Judgment: Judgment normal.

## 2023-08-02 ENCOUNTER — TELEPHONE (OUTPATIENT)
Dept: PSYCHIATRY | Facility: CLINIC | Age: 28
End: 2023-08-02

## 2023-08-02 NOTE — TELEPHONE ENCOUNTER
Pt taken off of talk therapy wait list due to no response for letter. Detail Level: Zone Quality 265: Biopsy Follow-Up: Biopsy results reviewed, communicated, tracked, and documented

## 2023-08-31 ENCOUNTER — HOSPITAL ENCOUNTER (OUTPATIENT)
Dept: SLEEP CENTER | Facility: CLINIC | Age: 28
Discharge: HOME | End: 2023-08-31
Payer: COMMERCIAL

## 2023-08-31 DIAGNOSIS — G47.33 OSA (OBSTRUCTIVE SLEEP APNEA): ICD-10-CM

## 2023-08-31 PROCEDURE — 95806 SLEEP STUDY UNATT&RESP EFFT: CPT | Performed by: INTERNAL MEDICINE

## 2023-08-31 PROCEDURE — G0399 HOME SLEEP TEST/TYPE 3 PORTA: HCPCS

## 2023-09-02 NOTE — PROGRESS NOTES
Home Sleep Study Documentation    HOME STUDY DEVICE: Noxturnal no                                           [place] G3 yes      Pre-Sleep Home Study:    Set-up and instructions performed by: Bubba Velez performed demonstration for Patient: yes    Return demonstration performed by Patient: yes    Written instructions provided to Patient: yes    Patient signed consent form: yes        Post-Sleep Home Study:    Additional comments by Patient: none    Home Sleep Study Failed:no:    Failure reason: N/A    Reported or Detected: N/A    Scored by:  CECY Menjivar

## 2023-09-06 DIAGNOSIS — G47.33 OSA (OBSTRUCTIVE SLEEP APNEA): Primary | ICD-10-CM

## 2023-09-27 DIAGNOSIS — N92.3 INTERMENSTRUAL BLEEDING: ICD-10-CM

## 2023-09-27 RX ORDER — NORETHINDRONE AND ETHINYL ESTRADIOL 0.8-25(24)
KIT ORAL
Qty: 84 TABLET | Refills: 0 | Status: SHIPPED | OUTPATIENT
Start: 2023-09-27

## 2023-11-13 ENCOUNTER — OFFICE VISIT (OUTPATIENT)
Dept: OBGYN CLINIC | Facility: CLINIC | Age: 28
End: 2023-11-13

## 2023-11-13 VITALS
SYSTOLIC BLOOD PRESSURE: 122 MMHG | HEIGHT: 63 IN | DIASTOLIC BLOOD PRESSURE: 80 MMHG | BODY MASS INDEX: 31.15 KG/M2 | WEIGHT: 175.8 LBS

## 2023-11-13 DIAGNOSIS — N92.3 INTERMENSTRUAL BLEEDING: ICD-10-CM

## 2023-11-13 DIAGNOSIS — Z01.411 ENCOUNTER FOR GYNECOLOGICAL EXAMINATION WITH ABNORMAL FINDING: Primary | ICD-10-CM

## 2023-11-13 PROCEDURE — G0145 SCR C/V CYTO,THINLAYER,RESCR: HCPCS | Performed by: PHYSICIAN ASSISTANT

## 2023-11-13 NOTE — PROGRESS NOTES
Assessment/Plan:    No problem-specific Assessment & Plan notes found for this encounter. Diagnoses and all orders for this visit:    Encounter for gynecological examination with abnormal finding  -     Liquid-based pap, screening    Intermenstrual bleeding        Pap done. Discussed OCP. Patient currently in 3rd week of pill pack. Will see if bleeding continues into next pack. If it does, will switch OCP. If no problems, patient to return in 1 year for routine gyn care. Subjective:      Patient ID: Cely Edmondson is a 34 y.o. female. Patient is here for yearly gyn exam.  States she is doing well overall. Has had intermenstrual bleeding this month on her OCP; prior to this had not gotten a period in 2 mos. Has had this issue with prior OCPs. Flow has varied, and cramping has been intermittent. States she has not missed any doses and is taking them on time. Followed by endocrinology for elevated AM cortisol and possible PCOS. Patient is not currently sexually active. Denies bowel/bladder changes, abdominal pain, change in appetite, and thyroid disease. Patient is performing self-breast exam.  Denies new masses, skin changes, nipple discharge, and pain/tenderness. The following portions of the patient's history were reviewed and updated as appropriate: allergies, current medications, past family history, past medical history, past social history, past surgical history, and problem list.    Review of Systems   Constitutional:  Negative for appetite change and unexpected weight change. Cardiovascular:         No masses, skin changes, nipple discharge, and pain/tenderness. Gastrointestinal:  Negative for abdominal distention, abdominal pain, constipation, diarrhea, nausea and vomiting. Genitourinary:  Positive for menstrual problem (Intermenstrual bleeding).  Negative for difficulty urinating, dysuria, frequency, genital sores, hematuria, pelvic pain, urgency, vaginal bleeding, vaginal discharge and vaginal pain. Objective:      /80 (BP Location: Left arm, Patient Position: Sitting, Cuff Size: Adult)   Ht 5' 3" (1.6 m)   Wt 79.7 kg (175 lb 12.8 oz)   LMP 10/31/2023 (Exact Date)   BMI 31.14 kg/m²          Physical Exam  Vitals reviewed. Exam conducted with a chaperone present. Constitutional:       Appearance: Normal appearance. She is well-developed. Neck:      Thyroid: No thyromegaly. Pulmonary:      Effort: Pulmonary effort is normal.   Chest:   Breasts:     Breasts are symmetrical.      Right: Normal. No swelling, bleeding, inverted nipple, mass, nipple discharge, skin change or tenderness. Left: Normal. No swelling, bleeding, inverted nipple, mass, nipple discharge, skin change or tenderness. Abdominal:      General: Abdomen is flat. There is no distension. Palpations: Abdomen is soft. Tenderness: There is no abdominal tenderness. Genitourinary:     General: Normal vulva. Pubic Area: No rash. Labia:         Right: No rash, tenderness, lesion or injury. Left: No rash, tenderness, lesion or injury. Vagina: Normal. No vaginal discharge, erythema, tenderness or bleeding. Cervix: Normal.      Uterus: Normal.       Adnexa: Right adnexa normal and left adnexa normal.        Right: No mass, tenderness or fullness. Left: No mass, tenderness or fullness. Musculoskeletal:      Cervical back: Neck supple. Lymphadenopathy:      Cervical: No cervical adenopathy. Upper Body:      Right upper body: No supraclavicular or axillary adenopathy. Left upper body: No supraclavicular or axillary adenopathy. Lower Body: No right inguinal adenopathy. No left inguinal adenopathy. Skin:     General: Skin is warm and dry. Neurological:      Mental Status: She is alert and oriented to person, place, and time.    Psychiatric:         Mood and Affect: Mood normal.         Behavior: Behavior normal. Behavior is cooperative. Thought Content:  Thought content normal.         Judgment: Judgment normal.

## 2023-11-14 ENCOUNTER — HOSPITAL ENCOUNTER (OUTPATIENT)
Dept: SLEEP CENTER | Facility: CLINIC | Age: 28
Discharge: HOME | End: 2023-11-14
Payer: COMMERCIAL

## 2023-11-14 DIAGNOSIS — G47.33 OSA (OBSTRUCTIVE SLEEP APNEA): ICD-10-CM

## 2023-11-14 PROCEDURE — 95810 POLYSOM 6/> YRS 4/> PARAM: CPT

## 2023-11-15 NOTE — PROGRESS NOTES
Sleep Study Documentation    Pre-Sleep Study       Sleep testing procedure explained to patient:YES    Patient napped prior to study:NO    Caffeine:Dayshift worker after 12PM.  Caffeine use:NO    Alcohol:Dayshift workers after 5PM: Alcohol use:NO    Typical day for patient:YES       Study Documentation    Sleep Study Indications: Snore, EDS, Unrefreshing sleep witnessed apneas, Chronic AM headache    Sleep Study: Diagnostic   Snore:None  Supplemental O2: no    O2 flow rate (L/min) range NA  O2 flow rate (L/min) final NA  Minimum SaO2 93  Baseline SaO2 97    Mode of Therapy: NA    EKG abnormalities: no     EEG abnormalities: no    Were abnormal behaviors in sleep observed:NO    Is Total Sleep Study Recording Time < 2 hours: N/A    Is Total Sleep Study Recording Time > 2 hours but study is incomplete: N/A    Is Total Sleep Study Recording Time 6 hours or more but sleep was not obtained: NO    Patient classification: employed       Post-Sleep Study    Medication used at bedtime or during sleep study:  Yes other prescription meds    Patient reports time it took to fall asleep:20 to 30 minutes    Patient reports waking up during study:1 to 2 times. Patient reports returning to sleep in greater than 30 minutes. Patient reports sleeping 6 to 8 hours without dreaming. Does the Patient feel this is a typical night of sleep:typical    Patient rated sleepiness: Very sleepy or tired    PAP treatment:no.

## 2023-11-21 LAB
LAB AP GYN PRIMARY INTERPRETATION: NORMAL
Lab: NORMAL

## 2023-12-13 ENCOUNTER — TELEPHONE (OUTPATIENT)
Dept: OBGYN CLINIC | Facility: CLINIC | Age: 28
End: 2023-12-13

## 2023-12-13 DIAGNOSIS — N92.6 IRREGULAR MENSES: Primary | ICD-10-CM

## 2023-12-13 RX ORDER — NORGESTIMATE AND ETHINYL ESTRADIOL 7DAYSX3 LO
1 KIT ORAL DAILY
Qty: 28 TABLET | Refills: 3 | Status: SHIPPED | OUTPATIENT
Start: 2023-12-13

## 2023-12-13 NOTE — TELEPHONE ENCOUNTER
Spoke with patient regarding OCPs. Still having cramping and irregular bleeding. Taking pill at the same time every day; has not skipped doses. Would like to try a different OCP. Rx for Ortho Tri-Cyclen Lo x 3 refills sent to pharmacy. Finish current pill pack, then start new medication. F/u in 3 mos for recheck. Call with problems.

## 2024-01-03 DIAGNOSIS — N92.6 IRREGULAR MENSES: ICD-10-CM

## 2024-01-05 RX ORDER — NORGESTIMATE-ETHINYL ESTRADIOL 7DAYSX3 LO
1 TABLET ORAL DAILY
Qty: 84 TABLET | Refills: 2 | Status: SHIPPED | OUTPATIENT
Start: 2024-01-05

## 2024-03-25 ENCOUNTER — OFFICE VISIT (OUTPATIENT)
Dept: FAMILY MEDICINE CLINIC | Facility: CLINIC | Age: 29
End: 2024-03-25
Payer: COMMERCIAL

## 2024-03-25 VITALS
BODY MASS INDEX: 30.97 KG/M2 | OXYGEN SATURATION: 98 % | HEIGHT: 63 IN | HEART RATE: 66 BPM | RESPIRATION RATE: 16 BRPM | TEMPERATURE: 97.4 F | WEIGHT: 174.8 LBS | DIASTOLIC BLOOD PRESSURE: 68 MMHG | SYSTOLIC BLOOD PRESSURE: 120 MMHG

## 2024-03-25 DIAGNOSIS — E66.09 CLASS 1 OBESITY DUE TO EXCESS CALORIES WITHOUT SERIOUS COMORBIDITY WITH BODY MASS INDEX (BMI) OF 30.0 TO 30.9 IN ADULT: ICD-10-CM

## 2024-03-25 DIAGNOSIS — E27.0 HYPERCORTISOLEMIA (HCC): ICD-10-CM

## 2024-03-25 DIAGNOSIS — J45.990 EXERCISE-INDUCED ASTHMA: ICD-10-CM

## 2024-03-25 DIAGNOSIS — Z00.01 ENCOUNTER FOR GENERAL ADULT MEDICAL EXAMINATION WITH ABNORMAL FINDINGS: Primary | ICD-10-CM

## 2024-03-25 PROBLEM — M94.0 COSTOCHONDRITIS: Status: RESOLVED | Noted: 2022-03-10 | Resolved: 2024-03-25

## 2024-03-25 PROBLEM — G47.33 OSA (OBSTRUCTIVE SLEEP APNEA): Status: RESOLVED | Noted: 2023-07-20 | Resolved: 2024-03-25

## 2024-03-25 PROBLEM — E66.811 CLASS 1 OBESITY DUE TO EXCESS CALORIES WITHOUT SERIOUS COMORBIDITY WITH BODY MASS INDEX (BMI) OF 30.0 TO 30.9 IN ADULT: Status: RESOLVED | Noted: 2023-07-20 | Resolved: 2024-03-25

## 2024-03-25 PROBLEM — R07.1 CHEST PAIN ON BREATHING: Status: RESOLVED | Noted: 2022-03-10 | Resolved: 2024-03-25

## 2024-03-25 PROBLEM — Z92.29 COVID-19 VACCINE SERIES COMPLETED: Status: RESOLVED | Noted: 2022-03-10 | Resolved: 2024-03-25

## 2024-03-25 PROCEDURE — 99395 PREV VISIT EST AGE 18-39: CPT | Performed by: FAMILY MEDICINE

## 2024-03-25 RX ORDER — ALBUTEROL SULFATE 90 UG/1
2 AEROSOL, METERED RESPIRATORY (INHALATION) EVERY 6 HOURS PRN
Qty: 18 G | Refills: 1 | Status: SHIPPED | OUTPATIENT
Start: 2024-03-25

## 2024-03-25 NOTE — PROGRESS NOTES
ADULT ANNUAL PHYSICAL  [facility]    NAME: Keila Linda  AGE: 28 y.o. SEX: female  : 1995     DATE: 3/25/2024     Assessment and Plan:     Problem List Items Addressed This Visit          Respiratory    Exercise-induced asthma    Relevant Medications    albuterol (ProAir HFA) 90 mcg/act inhaler       Other    Class 1 obesity due to excess calories without serious comorbidity with body mass index (BMI) of 30.0 to 30.9 in adult    Hypercortisolemia (HCC)    Relevant Orders    Cortisol Level, AM Specimen     Other Visit Diagnoses       Encounter for general adult medical examination with abnormal findings    -  Primary    Relevant Orders    CBC and differential    Comprehensive metabolic panel    Lipid panel    TSH, 3rd generation with Free T4 reflex            Did not follow up with labs ordered by endocrinologist, recommend follow up      Immunizations and preventive care screenings were discussed with patient today. Appropriate education was printed on patient's after visit summary.    Counseling:  Alcohol/drug use: discussed moderation in alcohol intake, the recommendations for healthy alcohol use, and avoidance of illicit drug use.  Dental Health: discussed importance of regular tooth brushing, flossing, and dental visits.  Injury prevention: discussed safety/seat belts, safety helmets, smoke detectors, carbon dioxide detectors, and smoking near bedding or upholstery.  Sexual health: discussed sexually transmitted diseases, partner selection, use of condoms, avoidance of unintended pregnancy, and contraceptive alternatives.  Exercise: the importance of regular exercise/physical activity was discussed. Recommend exercise 3-5 times per week for at least 30 minutes.       Depression Screening and Follow-up Plan: Patient was screened for depression during today's encounter. They screened negative with a PHQ-2 score of 1.        No follow-ups on  file.     Chief Complaint:     Chief Complaint   Patient presents with    Physical Exam      History of Present Illness:     Adult Annual Physical   Patient here for a comprehensive physical exam. The patient reports problems - chest pressure on exertion, usually inhaler helped .    Diet and Physical Activity  Diet/Nutrition: well balanced diet and consuming 3-5 servings of fruits/vegetables daily.   Exercise: moderate cardiovascular exercise.      Depression Screening  PHQ-2/9 Depression Screening    Little interest or pleasure in doing things: 0 - not at all  Feeling down, depressed, or hopeless: 1 - several days  PHQ-2 Score: 1  PHQ-2 Interpretation: Negative depression screen       General Health  Sleep: sleeps well.   Hearing:  grossly normal .  Vision: goes for regular eye exams.   Dental: regular dental visits.       /GYN Health  Follows with gynecology? yes  Last menstrual period: Patient's last menstrual period was 03/05/2024.    Contraceptive method: oral contraceptives.  History of STDs?: no.     Advanced Care Planning  Do you have an advanced directive? no  Do you have a durable medical power of ? no  ACP document given to the patient? no      Review of Systems:     Review of Systems   Constitutional:  Negative for fatigue and fever.   HENT:  Negative for congestion, facial swelling, mouth sores, rhinorrhea, sore throat and trouble swallowing.    Eyes:  Negative for pain and redness.   Respiratory:  Positive for chest tightness (with exertion). Negative for cough, shortness of breath and wheezing.    Cardiovascular:  Negative for chest pain, palpitations and leg swelling.   Gastrointestinal:  Negative for abdominal pain, blood in stool, constipation, diarrhea and nausea.   Genitourinary:  Negative for dysuria, hematuria and urgency.   Musculoskeletal:  Negative for arthralgias, back pain and myalgias.   Skin:  Negative for rash and wound.   Neurological:  Negative for seizures, syncope and  headaches.   Hematological:  Negative for adenopathy.   Psychiatric/Behavioral:  Negative for agitation and behavioral problems.       Past Medical History:     Past Medical History:   Diagnosis Date    Asthma     Chest pain on breathing 03/10/2022    Class 1 obesity due to excess calories without serious comorbidity with body mass index (BMI) of 30.0 to 30.9 in adult 07/20/2023    Costochondritis 03/10/2022    COVID-19 vaccine series completed 03/10/2022    URBAN (obstructive sleep apnea) 07/20/2023      Past Surgical History:     Past Surgical History:   Procedure Laterality Date    TONSILLECTOMY        Social History:     Social History     Socioeconomic History    Marital status: Single     Spouse name: None    Number of children: None    Years of education: None    Highest education level: None   Occupational History    None   Tobacco Use    Smoking status: Never    Smokeless tobacco: Never   Vaping Use    Vaping status: Never Used   Substance and Sexual Activity    Alcohol use: Yes     Comment: occ    Drug use: Never    Sexual activity: Yes     Partners: Male     Birth control/protection: OCP   Other Topics Concern    None   Social History Narrative    None     Social Determinants of Health     Financial Resource Strain: Not on file   Food Insecurity: Not on file   Transportation Needs: Not on file   Physical Activity: Not on file   Stress: Not on file   Social Connections: Not on file   Intimate Partner Violence: Not on file   Housing Stability: Not on file      Family History:     Family History   Problem Relation Age of Onset    Cancer Mother         Skin cancer    Polycystic ovary syndrome Mother     Prostate cancer Father     Cancer Father         Prostate cancer and bone cancer    Ovarian cysts Sister     Cancer Sister         Pre cancerous skin cancer    Polycystic ovary syndrome Sister     Dementia Maternal Grandmother     Cancer Maternal Grandmother         Great grandmother-breast cancer/thyroid cancer  "   Colon cancer Maternal Grandmother     Cancer Maternal Grandfather         Prostate cancer    Colon cancer Maternal Grandfather     Colon cancer Paternal Grandmother     Colon cancer Paternal Grandfather       Current Medications:     Current Outpatient Medications   Medication Sig Dispense Refill    albuterol (ProAir HFA) 90 mcg/act inhaler Inhale 2 puffs every 6 (six) hours as needed for wheezing or shortness of breath (and 20 min before exercise) 18 g 1    Ortho Tri-Cyclen Lo 0.18/0.215/0.25 MG-25 MCG per tablet TAKE 1 TABLET BY MOUTH EVERY DAY 84 tablet 2     No current facility-administered medications for this visit.      Allergies:     No Known Allergies   Physical Exam:     /68 (BP Location: Left arm, Patient Position: Sitting, Cuff Size: Adult)   Pulse 66   Temp (!) 97.4 °F (36.3 °C) (Tympanic)   Resp 16   Ht 5' 2.5\" (1.588 m)   Wt 79.3 kg (174 lb 12.8 oz)   LMP 03/05/2024   SpO2 98%   BMI 31.46 kg/m²     Physical Exam  Vitals and nursing note reviewed.   Constitutional:       Appearance: Normal appearance. She is well-developed. She is obese.   HENT:      Head: Normocephalic and atraumatic.      Right Ear: Tympanic membrane, ear canal and external ear normal.      Left Ear: Tympanic membrane, ear canal and external ear normal.      Nose: Nose normal.      Mouth/Throat:      Pharynx: No oropharyngeal exudate.   Eyes:      General: No scleral icterus.        Right eye: No discharge.         Left eye: No discharge.      Conjunctiva/sclera: Conjunctivae normal.      Pupils: Pupils are equal, round, and reactive to light.   Neck:      Thyroid: No thyromegaly.   Cardiovascular:      Rate and Rhythm: Normal rate and regular rhythm.      Heart sounds: No murmur heard.     No gallop.   Pulmonary:      Effort: Pulmonary effort is normal. No respiratory distress.      Breath sounds: Normal breath sounds. No wheezing or rales.   Abdominal:      Palpations: Abdomen is soft.      Tenderness: There is no " abdominal tenderness.   Musculoskeletal:         General: No tenderness or deformity.      Cervical back: Normal range of motion.      Right lower leg: No edema.      Left lower leg: No edema.   Lymphadenopathy:      Cervical: No cervical adenopathy.   Skin:     General: Skin is warm.      Capillary Refill: Capillary refill takes less than 2 seconds.      Findings: No erythema or rash.   Neurological:      Mental Status: She is alert and oriented to person, place, and time.      Deep Tendon Reflexes: Reflexes normal.   Psychiatric:         Behavior: Behavior normal.         Thought Content: Thought content normal.         Judgment: Judgment normal.          Quita Peralta MD   [facility]

## 2024-09-03 ENCOUNTER — OFFICE VISIT (OUTPATIENT)
Dept: FAMILY MEDICINE CLINIC | Facility: CLINIC | Age: 29
End: 2024-09-03
Payer: COMMERCIAL

## 2024-09-03 VITALS
WEIGHT: 169.8 LBS | TEMPERATURE: 98.4 F | BODY MASS INDEX: 30.09 KG/M2 | HEIGHT: 63 IN | HEART RATE: 69 BPM | SYSTOLIC BLOOD PRESSURE: 120 MMHG | OXYGEN SATURATION: 98 % | RESPIRATION RATE: 16 BRPM | DIASTOLIC BLOOD PRESSURE: 76 MMHG

## 2024-09-03 DIAGNOSIS — J00 NASOPHARYNGITIS ACUTE: Primary | ICD-10-CM

## 2024-09-03 DIAGNOSIS — J45.990 EXERCISE-INDUCED ASTHMA: ICD-10-CM

## 2024-09-03 LAB
S PYO AG THROAT QL: NEGATIVE
SARS-COV-2 AG UPPER RESP QL IA: NEGATIVE
VALID CONTROL: NORMAL

## 2024-09-03 PROCEDURE — 87880 STREP A ASSAY W/OPTIC: CPT | Performed by: FAMILY MEDICINE

## 2024-09-03 PROCEDURE — 87811 SARS-COV-2 COVID19 W/OPTIC: CPT | Performed by: FAMILY MEDICINE

## 2024-09-03 PROCEDURE — 3725F SCREEN DEPRESSION PERFORMED: CPT | Performed by: FAMILY MEDICINE

## 2024-09-03 PROCEDURE — 99214 OFFICE O/P EST MOD 30 MIN: CPT | Performed by: FAMILY MEDICINE

## 2024-09-03 RX ORDER — BROMPHENIRAMINE MALEATE, PSEUDOEPHEDRINE HYDROCHLORIDE, AND DEXTROMETHORPHAN HYDROBROMIDE 2; 30; 10 MG/5ML; MG/5ML; MG/5ML
5 SYRUP ORAL 4 TIMES DAILY PRN
Qty: 118 ML | Refills: 0 | Status: SHIPPED | OUTPATIENT
Start: 2024-09-03 | End: 2024-09-13

## 2024-09-03 RX ORDER — AZITHROMYCIN 250 MG/1
TABLET, FILM COATED ORAL
Qty: 6 TABLET | Refills: 0 | Status: SHIPPED | OUTPATIENT
Start: 2024-09-03 | End: 2024-09-08

## 2024-09-03 NOTE — PROGRESS NOTES
Subjective:      Patient ID: Keila Linda is a 29 y.o. female.    Sore Throat   This is a new problem. The current episode started in the past 7 days. The problem has been gradually worsening. Neither side of throat is experiencing more pain than the other. The maximum temperature recorded prior to her arrival was 100 - 100.9 F. The fever has been present for Less than 1 day. The pain is at a severity of 7/10. The pain is moderate. Associated symptoms include congestion, coughing, ear pain, headaches, a hoarse voice and trouble swallowing. Pertinent negatives include no abdominal pain, diarrhea or shortness of breath.       Past Medical History:   Diagnosis Date    Asthma     Chest pain on breathing 03/10/2022    Class 1 obesity due to excess calories without serious comorbidity with body mass index (BMI) of 30.0 to 30.9 in adult 07/20/2023    Costochondritis 03/10/2022    COVID-19 vaccine series completed 03/10/2022    URBAN (obstructive sleep apnea) 07/20/2023       Family History   Problem Relation Age of Onset    Cancer Mother         Skin cancer    Polycystic ovary syndrome Mother     Prostate cancer Father     Cancer Father         Prostate cancer and bone cancer    Ovarian cysts Sister     Cancer Sister         Pre cancerous skin cancer    Polycystic ovary syndrome Sister     Dementia Maternal Grandmother     Cancer Maternal Grandmother         Great grandmother-breast cancer/thyroid cancer    Colon cancer Maternal Grandmother     Cancer Maternal Grandfather         Prostate cancer    Colon cancer Maternal Grandfather     Colon cancer Paternal Grandmother     Colon cancer Paternal Grandfather        Past Surgical History:   Procedure Laterality Date    TONSILLECTOMY          reports that she has never smoked. She has never used smokeless tobacco. She reports current alcohol use. She reports that she does not use drugs.      Current Outpatient Medications:     albuterol (ProAir HFA) 90 mcg/act inhaler, Inhale  2 puffs every 6 (six) hours as needed for wheezing or shortness of breath (and 20 min before exercise), Disp: 18 g, Rfl: 1    azithromycin (Zithromax) 250 mg tablet, Take 2 tablets (500 mg total) by mouth daily for 1 day, THEN 1 tablet (250 mg total) daily for 4 days., Disp: 6 tablet, Rfl: 0    brompheniramine-pseudoephedrine-DM 30-2-10 MG/5ML syrup, Take 5 mL by mouth 4 (four) times a day as needed for cough or allergies for up to 10 days, Disp: 118 mL, Rfl: 0    Ortho Tri-Cyclen Lo 0.18/0.215/0.25 MG-25 MCG per tablet, TAKE 1 TABLET BY MOUTH EVERY DAY (Patient not taking: Reported on 9/3/2024), Disp: 84 tablet, Rfl: 2    The following portions of the patient's history were reviewed and updated as appropriate: allergies, current medications, past family history, past medical history, past social history, past surgical history and problem list.    Review of Systems   Constitutional:  Negative for fatigue and fever.   HENT:  Positive for congestion, ear pain, hoarse voice, sore throat and trouble swallowing. Negative for facial swelling, mouth sores and rhinorrhea.    Eyes:  Negative for pain and redness.   Respiratory:  Positive for cough. Negative for shortness of breath and wheezing.    Cardiovascular:  Negative for chest pain, palpitations and leg swelling.   Gastrointestinal:  Negative for abdominal pain, blood in stool, constipation, diarrhea and nausea.   Genitourinary:  Negative for dysuria, hematuria and urgency.   Musculoskeletal:  Negative for arthralgias, back pain and myalgias.   Skin:  Negative for rash and wound.   Neurological:  Positive for headaches. Negative for seizures and syncope.   Hematological:  Negative for adenopathy.   Psychiatric/Behavioral:  Negative for agitation and behavioral problems.          PHQ-2/9 Depression Screening    Little interest or pleasure in doing things: 0 - not at all  Feeling down, depressed, or hopeless: 0 - not at all  PHQ-2 Score: 0  PHQ-2 Interpretation: Negative  "depression screen             Objective:    /76 (BP Location: Right arm, Patient Position: Sitting, Cuff Size: Adult)   Pulse 69   Temp 98.4 °F (36.9 °C) (Tympanic)   Resp 16   Ht 5' 2.5\" (1.588 m)   Wt 77 kg (169 lb 12.8 oz)   SpO2 98%   BMI 30.56 kg/m²      Physical Exam  Vitals and nursing note reviewed.   Constitutional:       Appearance: Normal appearance. She is well-developed. She is not ill-appearing.   HENT:      Head: Normocephalic and atraumatic.      Right Ear: Tympanic membrane, ear canal and external ear normal.      Left Ear: Ear canal and external ear normal. A middle ear effusion is present.      Nose: Congestion and rhinorrhea present.      Mouth/Throat:      Mouth: Mucous membranes are moist.      Pharynx: Posterior oropharyngeal erythema present. No oropharyngeal exudate.      Tonsils: No tonsillar exudate. 1+ on the right. 1+ on the left.   Eyes:      General: No scleral icterus.        Right eye: No discharge.         Left eye: No discharge.      Conjunctiva/sclera: Conjunctivae normal.   Cardiovascular:      Rate and Rhythm: Normal rate.      Heart sounds: No murmur heard.     No gallop.   Pulmonary:      Effort: Pulmonary effort is normal. No respiratory distress.      Breath sounds: Normal breath sounds. No stridor. No wheezing, rhonchi or rales.   Abdominal:      Palpations: Abdomen is soft.      Tenderness: There is no abdominal tenderness.   Musculoskeletal:         General: No tenderness or deformity.   Skin:     Findings: No erythema or rash.   Neurological:      Mental Status: She is alert. Mental status is at baseline.   Psychiatric:         Behavior: Behavior normal.         Judgment: Judgment normal.           Recent Results (from the past 8736 hour(s))   Liquid-based pap, screening    Collection Time: 11/13/23  8:23 AM   Result Value Ref Range    Case Report       Gynecologic Cytology Report                       Case: [id number]                                "   Authorizing Provider:  Raysa Sheppard PA-C  Collected:           11/13/2023 0823              Ordering Location:     [facility]    Received:            11/13/2023 0823                                     [facility] [place]                                                             First Screen:          Rios Brady                                                                 Rescreen:              Velia Martin, [place]                                                       Specimen:    LIQUID-BASED PAP, SCREENING, Cervix                                                        Primary Interpretation Negative for intraepithelial lesion or malignancy     Specimen Adequacy       Satisfactory for evaluation. Endocervical/transformation zone component present.    Additional Information       [facility] [employer]s FDA approved ,  and ThinPrep Imaging Duo System are utilized with strict adherence to the 's instruction manual to prepare gynecologic and non-gynecologic cytology specimens for the production of ThinPrep slides as well as for gynecologic ThinPrep imaging. These processes have been validated by our laboratory and/or by the .  The Pap test is not a diagnostic procedure and should not be used as the sole means to detect cervical cancer. It is only a screening procedure to aid in the detection of cervical cancer and its precursors. Both false-negative and false-positive results have been experienced. Your patient's test result should be interpreted in this context together with the history and clinical findings.     POCT rapid ANTIGEN strepA    Collection Time: 09/03/24  3:50 PM   Result Value Ref Range     RAPID STREP A Negative Negative   POCT Rapid Covid Ag    Collection Time: 09/03/24  3:51 PM   Result Value Ref Range    POCT SARS-CoV-2 Ag Negative Negative    VALID CONTROL Valid        Laboratory Results: I have personally reviewed the pertinent laboratory  "results/reports         Assessment/Plan:  1. Nasopharyngitis acute  -     POCT rapid ANTIGEN strepA  -     POCT Rapid Covid Ag  -     azithromycin (Zithromax) 250 mg tablet; Take 2 tablets (500 mg total) by mouth daily for 1 day, THEN 1 tablet (250 mg total) daily for 4 days.  -     brompheniramine-pseudoephedrine-DM 30-2-10 MG/5ML syrup; Take 5 mL by mouth 4 (four) times a day as needed for cough or allergies for up to 10 days  2. Exercise-induced asthma      Symptoms are likely viral in origin.  Recommend Bromfed-DM as needed for cough and congestion.  If the symptoms do not improve in another 5 days and she can start azithromycin.    Advised supportive care, encouraged increased fluid hydration, rest, tylenol/ ibuprofen PRN fever/ pain.     No suspicion for Influenza at this time and no antiviral medication warranted.    RTO if not improving or worsening.    URI complications: Explained that today's exam is benign. Signs such as severe cough, pain with breathing, persistent high fevers, ear pain, and moderate/severe sinus pressure--may suggest secondary bacterial infection and should prompt follow up.    Discussed with patient symptoms are suggestive of common cold and there is no indication for antibiotics. If symptoms do not improve within 10 days from onset, call the office for antibiotic prescription or schedule a follow up appointment.          Depression Screening and Follow-up Plan: Patient was screened for depression during today's encounter. They screened negative with a PHQ-2 score of 0.          Read package inserts for all medications before starting a new medications, call me if you have any questions.    Patient was given opportunity to ask questions and all questions were answered.    Disclaimer: Portions of the record may have been created with voice recognition software. Occasional wrong word or \"sound a like\" substitutions may have occurred due to the inherent limitations of voice recognition " software. Read the chart carefully and recognize, using context, where substitutions have occurred. I have used the Epic copy/forward function to compose this note. I have reviewed my current note to ensure it reflects the current patient status, exam, assessment and plan.

## 2024-11-18 ENCOUNTER — ANNUAL EXAM (OUTPATIENT)
Dept: OBGYN CLINIC | Facility: CLINIC | Age: 29
End: 2024-11-18
Payer: COMMERCIAL

## 2024-11-18 VITALS
HEIGHT: 63 IN | DIASTOLIC BLOOD PRESSURE: 78 MMHG | BODY MASS INDEX: 29.88 KG/M2 | WEIGHT: 168.6 LBS | SYSTOLIC BLOOD PRESSURE: 112 MMHG

## 2024-11-18 DIAGNOSIS — Z11.3 SCREEN FOR STD (SEXUALLY TRANSMITTED DISEASE): ICD-10-CM

## 2024-11-18 DIAGNOSIS — Z01.419 ENCOUNTER FOR GYNECOLOGICAL EXAMINATION WITHOUT ABNORMAL FINDING: Primary | ICD-10-CM

## 2024-11-18 DIAGNOSIS — N92.6 IRREGULAR MENSES: ICD-10-CM

## 2024-11-18 PROCEDURE — 87491 CHLMYD TRACH DNA AMP PROBE: CPT | Performed by: PHYSICIAN ASSISTANT

## 2024-11-18 PROCEDURE — S0612 ANNUAL GYNECOLOGICAL EXAMINA: HCPCS | Performed by: PHYSICIAN ASSISTANT

## 2024-11-18 PROCEDURE — 87591 N.GONORRHOEAE DNA AMP PROB: CPT | Performed by: PHYSICIAN ASSISTANT

## 2024-11-18 PROCEDURE — G0145 SCR C/V CYTO,THINLAYER,RESCR: HCPCS | Performed by: PHYSICIAN ASSISTANT

## 2024-11-18 RX ORDER — NORGESTIMATE AND ETHINYL ESTRADIOL 7DAYSX3 LO
1 KIT ORAL DAILY
Qty: 84 TABLET | Refills: 3 | Status: SHIPPED | OUTPATIENT
Start: 2024-11-18

## 2024-11-18 NOTE — PROGRESS NOTES
"Assessment/Plan:      Diagnoses and all orders for this visit:    Encounter for gynecological examination without abnormal finding  -     Liquid-based pap, screening    Screen for STD (sexually transmitted disease)  -     Chlamydia/GC amplified DNA by PCR    Irregular menses  -     norgestimate-ethinyl estradiol (Ortho Tri-Cyclen Lo) 0.18/0.215/0.25 MG-25 MCG per tablet; Take 1 tablet by mouth daily        Pap and GC/chlamydia screening done.  We will call with STD testing results.  Refills of OCP sent to pharmacy.  Call if periods worsen or change.  If no problems, patient to return in 1 year for routine gyn care.    Subjective:     Patient ID: Keila Linda is a 29 y.o. female.    Patient is here for annual exam.  States she is doing well.  No complaints on her OCP; just restarted medication.  Has had some cramping with her current period.  Requests refills today.  She is sexually active with a new partner; they are using condoms.  Requests STD screening.  Patient denies any change in bowel/bladder habits, pelvic pain, bloating, abdominal pain, n/v, change in appetite, and thyroid disease.    Patient is not performing self-breast exam.  Denies new masses, skin changes, nipple discharge, and pain/tenderness.        Review of Systems   Constitutional:  Negative for appetite change and unexpected weight change.   Cardiovascular:         No masses, skin changes, nipple discharge, and pain/tenderness.   Gastrointestinal:  Negative for abdominal distention, abdominal pain, constipation, diarrhea, nausea and vomiting.   Genitourinary:  Negative for difficulty urinating, dysuria, frequency, genital sores, hematuria, menstrual problem, pelvic pain, urgency, vaginal bleeding, vaginal discharge and vaginal pain.         Objective:  Visit Vitals  /78 (BP Location: Left arm, Patient Position: Sitting)   Ht 5' 2.5\" (1.588 m)   Wt 76.5 kg (168 lb 9.6 oz)   LMP 11/17/2024 (Exact Date)   BMI 30.35 kg/m²   OB Status Having " periods   Smoking Status Never   BSA 1.79 m²         Physical Exam  Vitals reviewed. Exam conducted with a chaperone present.   Constitutional:       Appearance: Normal appearance. She is well-developed.   Neck:      Thyroid: No thyromegaly.   Pulmonary:      Effort: Pulmonary effort is normal.   Chest:   Breasts:     Breasts are symmetrical.      Right: Normal. No swelling, bleeding, inverted nipple, mass, nipple discharge, skin change or tenderness.      Left: Normal. No swelling, bleeding, inverted nipple, mass, nipple discharge, skin change or tenderness.   Abdominal:      General: There is no distension.      Palpations: Abdomen is soft.      Tenderness: There is no abdominal tenderness.   Genitourinary:     General: Normal vulva.      Pubic Area: No rash.       Labia:         Right: No rash, tenderness, lesion or injury.         Left: No rash, tenderness, lesion or injury.       Vagina: Normal. No vaginal discharge, erythema, tenderness or bleeding.      Cervix: Normal.      Uterus: Normal.       Adnexa: Right adnexa normal and left adnexa normal.        Right: No mass, tenderness or fullness.          Left: No mass, tenderness or fullness.     Musculoskeletal:      Cervical back: Neck supple.   Lymphadenopathy:      Cervical: No cervical adenopathy.      Upper Body:      Right upper body: No supraclavicular or axillary adenopathy.      Left upper body: No supraclavicular or axillary adenopathy.      Lower Body: No right inguinal adenopathy. No left inguinal adenopathy.   Skin:     General: Skin is warm and dry.   Neurological:      Mental Status: She is alert and oriented to person, place, and time.   Psychiatric:         Behavior: Behavior normal. Behavior is cooperative.         Thought Content: Thought content normal.         Judgment: Judgment normal.

## 2024-11-19 LAB
C TRACH DNA SPEC QL NAA+PROBE: NEGATIVE
N GONORRHOEA DNA SPEC QL NAA+PROBE: NEGATIVE

## 2024-11-20 ENCOUNTER — RESULTS FOLLOW-UP (OUTPATIENT)
Dept: LABOR AND DELIVERY | Facility: HOSPITAL | Age: 29
End: 2024-11-20

## 2024-11-25 LAB
LAB AP GYN PRIMARY INTERPRETATION: NORMAL
Lab: NORMAL
PATH INTERP SPEC-IMP: NORMAL

## 2025-01-23 DIAGNOSIS — Z00.6 ENCOUNTER FOR EXAMINATION FOR NORMAL COMPARISON OR CONTROL IN CLINICAL RESEARCH PROGRAM: ICD-10-CM

## 2025-02-26 ENCOUNTER — TELEPHONE (OUTPATIENT)
Dept: OBGYN CLINIC | Facility: CLINIC | Age: 30
End: 2025-02-26

## 2025-02-26 NOTE — TELEPHONE ENCOUNTER
Spoke with patient regarding breakthrough bleeding - seems to have stopped as of today.  Will continue to monitor for the next 2 mos.  If it continues, will discuss switching OCP.  Call with further questions.

## 2025-03-14 LAB
ALBUMIN SERPL-MCNC: 4.6 G/DL (ref 3.6–5.1)
ALBUMIN/GLOB SERPL: 1.7 (CALC) (ref 1–2.5)
ALP SERPL-CCNC: 41 U/L (ref 31–125)
ALT SERPL-CCNC: 10 U/L (ref 6–29)
AST SERPL-CCNC: 18 U/L (ref 10–30)
BASOPHILS # BLD AUTO: 31 CELLS/UL (ref 0–200)
BASOPHILS NFR BLD AUTO: 0.8 %
BILIRUB SERPL-MCNC: 0.4 MG/DL (ref 0.2–1.2)
BUN SERPL-MCNC: 22 MG/DL (ref 7–25)
BUN/CREAT SERPL: NORMAL (CALC) (ref 6–22)
CALCIUM SERPL-MCNC: 9.5 MG/DL (ref 8.6–10.2)
CHLORIDE SERPL-SCNC: 106 MMOL/L (ref 98–110)
CHOLEST SERPL-MCNC: 165 MG/DL
CHOLEST/HDLC SERPL: 2.5 (CALC)
CO2 SERPL-SCNC: 27 MMOL/L (ref 20–32)
CORTIS AM PEAK SERPL-MCNC: 33.3 MCG/DL
CREAT SERPL-MCNC: 0.91 MG/DL (ref 0.5–0.96)
EOSINOPHIL # BLD AUTO: 382 CELLS/UL (ref 15–500)
EOSINOPHIL NFR BLD AUTO: 9.8 %
ERYTHROCYTE [DISTWIDTH] IN BLOOD BY AUTOMATED COUNT: 12.4 % (ref 11–15)
GFR/BSA.PRED SERPLBLD CYS-BASED-ARV: 89 ML/MIN/1.73M2
GLOBULIN SER CALC-MCNC: 2.7 G/DL (CALC) (ref 1.9–3.7)
GLUCOSE SERPL-MCNC: 79 MG/DL (ref 65–99)
HCT VFR BLD AUTO: 41.1 % (ref 35–45)
HDLC SERPL-MCNC: 66 MG/DL
HGB BLD-MCNC: 13.4 G/DL (ref 11.7–15.5)
LDLC SERPL CALC-MCNC: 85 MG/DL (CALC)
LYMPHOCYTES # BLD AUTO: 1997 CELLS/UL (ref 850–3900)
LYMPHOCYTES NFR BLD AUTO: 51.2 %
MCH RBC QN AUTO: 31.2 PG (ref 27–33)
MCHC RBC AUTO-ENTMCNC: 32.6 G/DL (ref 32–36)
MCV RBC AUTO: 95.8 FL (ref 80–100)
MONOCYTES # BLD AUTO: 281 CELLS/UL (ref 200–950)
MONOCYTES NFR BLD AUTO: 7.2 %
NEUTROPHILS # BLD AUTO: 1209 CELLS/UL (ref 1500–7800)
NEUTROPHILS NFR BLD AUTO: 31 %
NONHDLC SERPL-MCNC: 99 MG/DL (CALC)
PLATELET # BLD AUTO: 297 THOUSAND/UL (ref 140–400)
PMV BLD REES-ECKER: 9.7 FL (ref 7.5–12.5)
POTASSIUM SERPL-SCNC: 4.5 MMOL/L (ref 3.5–5.3)
PROT SERPL-MCNC: 7.3 G/DL (ref 6.1–8.1)
RBC # BLD AUTO: 4.29 MILLION/UL (ref 3.8–5.1)
SODIUM SERPL-SCNC: 138 MMOL/L (ref 135–146)
TRIGL SERPL-MCNC: 59 MG/DL
TSH SERPL-ACNC: 1.23 MIU/L
WBC # BLD AUTO: 3.9 THOUSAND/UL (ref 3.8–10.8)

## 2025-03-17 ENCOUNTER — RESULTS FOLLOW-UP (OUTPATIENT)
Dept: FAMILY MEDICINE CLINIC | Facility: CLINIC | Age: 30
End: 2025-03-17

## 2025-03-21 ENCOUNTER — NURSE TRIAGE (OUTPATIENT)
Age: 30
End: 2025-03-21

## 2025-03-21 DIAGNOSIS — N92.6 IRREGULAR MENSES: Primary | ICD-10-CM

## 2025-03-21 RX ORDER — NORGESTIMATE AND ETHINYL ESTRADIOL 7DAYSX3 28
1 KIT ORAL DAILY
Qty: 84 TABLET | Refills: 2 | Status: SHIPPED | OUTPATIENT
Start: 2025-03-21

## 2025-03-21 NOTE — TELEPHONE ENCOUNTER
I called and spoke to patient regarding her breakthrough bleeding with birth control.  She states she had similar issue with another OCP that was changed in December 2023 and did fairly well with current birth control Ortho Tri-Cyclen Lo until recently.  Per Raysa's note back in February can try different OCP.  Discussed staying with same triphasic OCP but increasing estrogen which may help with breakthrough bleeding.  Patient agreeable for Ortho Tri-Cyclen.  She is currently on placebo week of her current birth control pack and will complete this and transition to new OCP thereafter.  Pharmacy confirmed with new OCP prescription sent to pharmacy.  She will call if any issues otherwise next annual exam scheduled for November.

## 2025-03-21 NOTE — TELEPHONE ENCOUNTER
"FOLLOW UP: Discuss with provider and call patient back.    REASON FOR CONVERSATION: Vaginal Bleeding    SYMPTOMS: irregular vaginal bleeding on ocp's    OTHER: Patient taking ortho-tri-cyclen lo since December 2023. In February patient had vaginal bleeding 2/10-2/25 and then restarted 2/28-3/16 and then bleeding restarted today, 3/21. Patient currently saturating tampon every 1-2 hours. Denies clots and feeling lightheaded, dizzy, weak. Patient has not missed any ocp doses or taken any doses late. Routing to provider for recommendations.     DISPOSITION: Discuss with Provider and Call Back Patient      Answer Assessment - Initial Assessment Questions  1. TYPE: \"What is the name of the birth control pill you are using?\"      Ortho-tri-cyclen lo  2. PACK TYPE: \"How do you take your birth control pill?\"      daily  3. START DATE: \"When did you first start taking this birth control pill?\"      December 2023  4. SYMPTOM: \"What is the main symptom (or question) you're concerned about?\"      Breakthrough bleeding  5. ONSET: \"When did the symptoms start?\"      February  6. VAGINAL BLEEDING: \"Are you having any unusual vaginal bleeding?\"      Yes, bleeding occurring more frequently and lasting longer  7. ABDOMEN OR PELVIC PAIN: \"Are you have any pain in your abdomen or pelvic area?\" (Scale: 0, 1-10; none, mild, moderate, severe)      Moderate abdominal cramping  8. MISSED OR LATE PILLS: \"Did you miss or take any pills late?\" If Yes, ask: \"When? How many pills?\"  Note: Pill is considered missed if taken more than 24 hours later than scheduled time.      denies  9. PREGNANCY: \"Are you concerned you might be pregnant?\" \"When was your last menstrual period?\"      denies    Protocols used: Contraception - Birth Control Pills - Combined-Adult-[place]    "

## 2025-03-26 ENCOUNTER — OFFICE VISIT (OUTPATIENT)
Dept: FAMILY MEDICINE CLINIC | Facility: CLINIC | Age: 30
End: 2025-03-26
Payer: COMMERCIAL

## 2025-03-26 VITALS
BODY MASS INDEX: 29.77 KG/M2 | HEART RATE: 66 BPM | OXYGEN SATURATION: 98 % | DIASTOLIC BLOOD PRESSURE: 80 MMHG | WEIGHT: 168 LBS | RESPIRATION RATE: 16 BRPM | HEIGHT: 63 IN | SYSTOLIC BLOOD PRESSURE: 124 MMHG

## 2025-03-26 DIAGNOSIS — J45.990 EXERCISE-INDUCED ASTHMA: ICD-10-CM

## 2025-03-26 DIAGNOSIS — Z23 ENCOUNTER FOR IMMUNIZATION: ICD-10-CM

## 2025-03-26 DIAGNOSIS — Z00.01 ENCOUNTER FOR GENERAL ADULT MEDICAL EXAMINATION WITH ABNORMAL FINDINGS: Primary | ICD-10-CM

## 2025-03-26 DIAGNOSIS — E27.0 HYPERCORTISOLEMIA (HCC): ICD-10-CM

## 2025-03-26 DIAGNOSIS — Z13.6 SCREENING FOR CARDIOVASCULAR CONDITION: ICD-10-CM

## 2025-03-26 DIAGNOSIS — N93.8 DUB (DYSFUNCTIONAL UTERINE BLEEDING): ICD-10-CM

## 2025-03-26 DIAGNOSIS — Z13.29 THYROID DISORDER SCREENING: ICD-10-CM

## 2025-03-26 DIAGNOSIS — Z13.1 SCREENING FOR DIABETES MELLITUS: ICD-10-CM

## 2025-03-26 PROCEDURE — 99395 PREV VISIT EST AGE 18-39: CPT | Performed by: FAMILY MEDICINE

## 2025-03-26 PROCEDURE — 90677 PCV20 VACCINE IM: CPT | Performed by: FAMILY MEDICINE

## 2025-03-26 PROCEDURE — 99214 OFFICE O/P EST MOD 30 MIN: CPT | Performed by: FAMILY MEDICINE

## 2025-03-26 PROCEDURE — 90471 IMMUNIZATION ADMIN: CPT | Performed by: FAMILY MEDICINE

## 2025-03-26 NOTE — PATIENT INSTRUCTIONS
"Patient Education     Routine physical for adults   The Basics   Written by the doctors and editors at [facility]   What is a physical? -- A physical is a routine visit, or \"check-up,\" with your doctor. You might also hear it called a \"wellness visit\" or \"preventive visit.\"  During each visit, the doctor will:   Ask about your physical and mental health   Ask about your habits, behaviors, and lifestyle   Do an exam   Give you vaccines if needed   Talk to you about any medicines you take   Give advice about your health   Answer your questions  Getting regular check-ups is an important part of taking care of your health. It can help your doctor find and treat any problems you have. But it's also important for preventing health problems.  A routine physical is different from a \"sick visit.\" A sick visit is when you see a doctor because of a health concern or problem. Since physicals are scheduled ahead of time, you can think about what you want to ask the doctor.  How often should I get a physical? -- It depends on your age and health. In general, for people age 21 years and older:   If you are younger than 50 years, you might be able to get a physical every 3 years.   If you are 50 years or older, your doctor might recommend a physical every year.  If you have an ongoing health condition, like diabetes or high blood pressure, your doctor will probably want to see you more often.  What happens during a physical? -- In general, each visit will include:   Physical exam - The doctor or nurse will check your height, weight, heart rate, and blood pressure. They will also look at your eyes and ears. They will ask about how you are feeling and whether you have any symptoms that bother you.   Medicines - It's a good idea to bring a list of all the medicines you take to each doctor visit. Your doctor will talk to you about your medicines and answer any questions. Tell them if you are having any side effects that bother you. You " "should also tell them if you are having trouble paying for any of your medicines.   Habits and behaviors - This includes:   Your diet   Your exercise habits   Whether you smoke, drink alcohol, or use drugs   Whether you are sexually active   Whether you feel safe at home  Your doctor will talk to you about things you can do to improve your health and lower your risk of health problems. They will also offer help and support. For example, if you want to quit smoking, they can give you advice and might prescribe medicines. If you want to improve your diet or get more physical activity, they can help you with this, too.   Lab tests, if needed - The tests you get will depend on your age and situation. For example, your doctor might want to check your:   Cholesterol   Blood sugar   Iron level   Vaccines - The recommended vaccines will depend on your age, health, and what vaccines you already had. Vaccines are very important because they can prevent certain serious or deadly infections.   Discussion of screening - \"Screening\" means checking for diseases or other health problems before they cause symptoms. Your doctor can recommend screening based on your age, risk, and preferences. This might include tests to check for:   Cancer, such as breast, prostate, cervical, ovarian, colorectal, prostate, lung, or skin cancer   Sexually transmitted infections, such as chlamydia and gonorrhea   Mental health conditions like depression and anxiety  Your doctor will talk to you about the different types of screening tests. They can help you decide which screenings to have. They can also explain what the results might mean.   Answering questions - The physical is a good time to ask the doctor or nurse questions about your health. If needed, they can refer you to other doctors or specialists, too.  Adults older than 65 years often need other care, too. As you get older, your doctor will talk to you about:   How to prevent falling at " home   Hearing or vision tests   Memory testing   How to take your medicines safely   Making sure that you have the help and support you need at home  All topics are updated as new evidence becomes available and our peer review process is complete.  This topic retrieved from [employer] on: May 02, 2024.  Topic 192091 Version 1.0  Release: 32.4.3 - C32.122  © 2024 UpToDate, Inc. and/or its affiliates. All rights reserved.  Consumer Information Use and Disclaimer   Disclaimer: This generalized information is a limited summary of diagnosis, treatment, and/or medication information. It is not meant to be comprehensive and should be used as a tool to help the user understand and/or assess potential diagnostic and treatment options. It does NOT include all information about conditions, treatments, medications, side effects, or risks that may apply to a specific patient. It is not intended to be medical advice or a substitute for the medical advice, diagnosis, or treatment of a health care provider based on the health care provider's examination and assessment of a patient's specific and unique circumstances. Patients must speak with a health care provider for complete information about their health, medical questions, and treatment options, including any risks or benefits regarding use of medications. This information does not endorse any treatments or medications as safe, effective, or approved for treating a specific patient. UpToDate, Inc. and its affiliates disclaim any warranty or liability relating to this information or the use thereof.The use of this information is governed by the Terms of Use, available at https://www.woltersH-umusuwer.com/en/know/clinical-effectiveness-terms. 2024© UpToDate, Inc. and its affiliates and/or licensors. All rights reserved.  Copyright   © 2024 UpToDate, Inc. and/or its affiliates. All rights reserved.

## 2025-03-26 NOTE — ASSESSMENT & PLAN NOTE
We reviewed discussed elevated cortisol.  Recommend MRI of the abdomen to rule out adrenal tumors.  Will refer to endocrinology.  She is previously seeing someone and at that time the cortisol to be low normal and was told that she no longer need follow-up.  Orders:    Ambulatory Referral to Endocrinology; Future    MRI abdomen wo contrast; Future    Cortisol Level, AM Specimen; Future

## 2025-03-26 NOTE — ASSESSMENT & PLAN NOTE
Follows with OB/GYN has recently switched to birth control due to severe breakthrough bleeding.  He had to start the new prescription for Tri-Femynor

## 2025-03-26 NOTE — PROGRESS NOTES
Adult Annual Physical  Name: Keila Linda      : 1995      MRN: [id number]  Encounter Provider: Quita Peralta MD  Encounter Date: 3/26/2025   Encounter department: [facility]    Assessment & Plan  Encounter for general adult medical examination with abnormal findings    Orders:    CBC and differential; Future    Comprehensive metabolic panel; Future    Hemoglobin A1C; Future    Lipid panel; Future    TSH, 3rd generation with Free T4 reflex; Future    Hypercortisolemia (HCC)  We reviewed discussed elevated cortisol.  Recommend MRI of the abdomen to rule out adrenal tumors.  Will refer to endocrinology.  She is previously seeing someone and at that time the cortisol to be low normal and was told that she no longer need follow-up.  Orders:    Ambulatory Referral to Endocrinology; Future    MRI abdomen wo contrast; Future    Cortisol Level, AM Specimen; Future    Exercise-induced asthma  Albuterol as needed       Screening for diabetes mellitus    Orders:    Hemoglobin A1C; Future    Screening for cardiovascular condition    Orders:    Lipid panel; Future    Encounter for immunization    Orders:    Pneumococcal Conjugate Vaccine 20-valent (Pcv20)    DUB (dysfunctional uterine bleeding)  Follows with OB/GYN has recently switched to birth control due to severe breakthrough bleeding.  He had to start the new prescription for Tri-Femynor       Thyroid disorder screening    Orders:    TSH, 3rd generation with Free T4 reflex; Future      Preventive Screenings:  - Diabetes Screening: screening up-to-date  - Cholesterol Screening: screening up-to-date   - Hepatitis C screening: screening up-to-date   - HIV screening: screening up-to-date   - Cervical cancer screening: screening up-to-date   - Colon cancer screening: screening not indicated   - Lung cancer screening: screening not indicated     Maternal Grandfather at age of 60     Immunizations:  - Immunizations due: Influenza      Depression  Screening and Follow-up Plan: Patient was screened for depression during today's encounter. They screened negative with a PHQ-2 score of 2.          History of Present Illness     Adult Annual Physical:  Patient presents for annual physical. For annual physical and follow-up and discuss labs.     Diet and Physical Activity:  - Diet/Nutrition: well balanced diet.  - Exercise: vigorous cardiovascular exercise, 5-7 times a week on average and 1-2 hours on average.    Depression Screening:  - PHQ-2 Score: 2    General Health:  - Sleep: 7-8 hours of sleep on average.  - Hearing: normal hearing right ear and normal hearing left ear.  - Vision: wears glasses and contacts.  - Dental: regular dental visits, brushes teeth twice daily and floss regularly.    /GYN Health:  - Follows with GYN: yes.   - Last menstrual cycle: 3/16/2025.   - History of STDs: no  - Contraception: oral contraceptives.      Advanced Care Planning:  - Has an advanced directive?: no    - Has a durable medical POA?: no      Review of Systems   Constitutional:  Negative for fatigue and fever.   HENT:  Negative for congestion, facial swelling, mouth sores, rhinorrhea, sore throat and trouble swallowing.    Eyes:  Negative for pain and redness.   Respiratory:  Negative for cough, shortness of breath and wheezing.    Cardiovascular:  Negative for chest pain, palpitations and leg swelling.   Gastrointestinal:  Negative for abdominal pain, blood in stool, constipation, diarrhea and nausea.   Genitourinary:  Negative for dysuria, hematuria and urgency.   Musculoskeletal:  Negative for arthralgias, back pain and myalgias.   Skin:  Negative for rash and wound.   Neurological:  Negative for seizures, syncope and headaches.   Hematological:  Negative for adenopathy.   Psychiatric/Behavioral:  Negative for agitation and behavioral problems.      Medical History Reviewed by provider this encounter:  Tobacco  Allergies  Meds  Problems  Med Hx  Surg Hx  Fam Hx  "    .  Current Outpatient Medications on File Prior to Visit   Medication Sig Dispense Refill    albuterol (ProAir HFA) 90 mcg/act inhaler Inhale 2 puffs every 6 (six) hours as needed for wheezing or shortness of breath (and 20 min before exercise) 18 g 1    norgestimate-ethinyl estradiol (Tri Femynor) 0.18/0.215/0.25 MG-35 MCG per tablet Take 1 tablet by mouth daily 84 tablet 2     No current facility-administered medications on file prior to visit.      Social History     Tobacco Use    Smoking status: Never    Smokeless tobacco: Never   Vaping Use    Vaping status: Never Used   Substance and Sexual Activity    Alcohol use: Yes     Comment: occ    Drug use: Never    Sexual activity: Yes     Partners: Male     Birth control/protection: OCP       Objective   /80 (BP Location: Right arm, Patient Position: Sitting, Cuff Size: Adult)   Pulse 66   Resp 16   Ht 5' 2.5\" (1.588 m)   Wt 76.2 kg (168 lb)   LMP 03/16/2025   SpO2 98%   BMI 30.24 kg/m²     Physical Exam  Vitals and nursing note reviewed.   Constitutional:       Appearance: Normal appearance. She is well-developed. She is obese.   HENT:      Head: Normocephalic and atraumatic.      Right Ear: Tympanic membrane, ear canal and external ear normal.      Left Ear: Tympanic membrane, ear canal and external ear normal.      Nose: Nose normal.      Mouth/Throat:      Pharynx: No oropharyngeal exudate.   Eyes:      General: No scleral icterus.        Right eye: No discharge.         Left eye: No discharge.      Conjunctiva/sclera: Conjunctivae normal.      Pupils: Pupils are equal, round, and reactive to light.   Neck:      Thyroid: No thyromegaly.   Cardiovascular:      Rate and Rhythm: Normal rate and regular rhythm.      Heart sounds: No murmur heard.     No gallop.   Pulmonary:      Effort: Pulmonary effort is normal. No respiratory distress.      Breath sounds: Normal breath sounds. No wheezing or rales.   Abdominal:      Palpations: Abdomen is soft. "      Tenderness: There is no abdominal tenderness.   Musculoskeletal:         General: No tenderness or deformity.      Cervical back: Normal range of motion.      Right lower leg: No edema.      Left lower leg: No edema.   Lymphadenopathy:      Cervical: No cervical adenopathy.   Skin:     General: Skin is warm.      Capillary Refill: Capillary refill takes less than 2 seconds.      Findings: No erythema or rash.   Neurological:      Mental Status: She is alert and oriented to person, place, and time.      Deep Tendon Reflexes: Reflexes normal.   Psychiatric:         Behavior: Behavior normal.         Thought Content: Thought content normal.         Judgment: Judgment normal.

## 2025-04-15 ENCOUNTER — APPOINTMENT (OUTPATIENT)
Dept: LAB | Facility: CLINIC | Age: 30
End: 2025-04-15

## 2025-04-15 DIAGNOSIS — Z00.6 ENCOUNTER FOR EXAMINATION FOR NORMAL COMPARISON OR CONTROL IN CLINICAL RESEARCH PROGRAM: ICD-10-CM

## 2025-04-15 PROCEDURE — 36415 COLL VENOUS BLD VENIPUNCTURE: CPT

## 2025-04-22 ENCOUNTER — HOSPITAL ENCOUNTER (OUTPATIENT)
Dept: MRI IMAGING | Facility: HOSPITAL | Age: 30
Discharge: HOME | End: 2025-04-22
Attending: FAMILY MEDICINE
Payer: COMMERCIAL

## 2025-04-22 DIAGNOSIS — E27.0 HYPERCORTISOLEMIA (HCC): ICD-10-CM

## 2025-04-22 PROCEDURE — 74181 MRI ABDOMEN W/O CONTRAST: CPT

## 2025-04-28 LAB
APOB+LDLR+PCSK9 GENE MUT ANL BLD/T: NOT DETECTED
BRCA1+BRCA2 DEL+DUP + FULL MUT ANL BLD/T: NOT DETECTED
MLH1+MSH2+MSH6+PMS2 GN DEL+DUP+FUL M: NOT DETECTED

## 2025-04-29 ENCOUNTER — RESULTS FOLLOW-UP (OUTPATIENT)
Dept: FAMILY MEDICINE CLINIC | Facility: CLINIC | Age: 30
End: 2025-04-29

## 2025-05-08 ENCOUNTER — CONSULT (OUTPATIENT)
Dept: ENDOCRINOLOGY | Facility: CLINIC | Age: 30
End: 2025-05-08
Payer: COMMERCIAL

## 2025-05-08 VITALS
OXYGEN SATURATION: 98 % | RESPIRATION RATE: 14 BRPM | DIASTOLIC BLOOD PRESSURE: 84 MMHG | WEIGHT: 170 LBS | HEIGHT: 63 IN | TEMPERATURE: 97.9 F | BODY MASS INDEX: 30.12 KG/M2 | HEART RATE: 62 BPM | SYSTOLIC BLOOD PRESSURE: 132 MMHG

## 2025-05-08 DIAGNOSIS — E66.811 CLASS 1 OBESITY DUE TO EXCESS CALORIES WITHOUT SERIOUS COMORBIDITY WITH BODY MASS INDEX (BMI) OF 30.0 TO 30.9 IN ADULT: ICD-10-CM

## 2025-05-08 DIAGNOSIS — E66.09 CLASS 1 OBESITY DUE TO EXCESS CALORIES WITHOUT SERIOUS COMORBIDITY WITH BODY MASS INDEX (BMI) OF 30.0 TO 30.9 IN ADULT: ICD-10-CM

## 2025-05-08 DIAGNOSIS — E27.0 HYPERCORTISOLEMIA (HCC): Primary | ICD-10-CM

## 2025-05-08 PROCEDURE — 99215 OFFICE O/P EST HI 40 MIN: CPT | Performed by: INTERNAL MEDICINE

## 2025-05-08 RX ORDER — DEXAMETHASONE 1 MG
TABLET ORAL
Qty: 1 TABLET | Refills: 0 | Status: SHIPPED | OUTPATIENT
Start: 2025-05-08

## 2025-05-08 NOTE — PROGRESS NOTES
"    Follow-up Patient Progress Note      CC: elevated cortisol     History of Present Illness:   30 yr generally healthy female with PCOS was referred for elevated a.m. cortisol. Last visit was 7/11/23.     Since last visit, weight is same.  She does not report any symptoms except some fatigue, waking up tired. No hirsutism.     She is active and medical fitness training includes HIIT training.  She typically takes 1500 tony/day.     Menstrual Hx: menarche age 18yr. Weight 120 lbs. Always irregular and menorrhagia - started COCP age 20. She continues to have some irregular menstruation in  Spite of COCP. Follows GYN.     Max adult weight: 170 lbs. Min adult weight: 135 lbs age 22.     FH: diabetes - MA, MU, mother prediabetes. PCOS - sister, aunt      Physical Exam:  Body mass index is 30.6 kg/m².  /84 (BP Location: Left arm, Patient Position: Sitting)   Pulse 62   Temp 97.9 °F (36.6 °C) (Tympanic)   Resp 14   Ht 5' 2.5\" (1.588 m)   Wt 77.1 kg (170 lb)   SpO2 98%   BMI 30.60 kg/m²    Vitals:    05/08/25 0901   Weight: 77.1 kg (170 lb)        Physical Exam  Constitutional:       General: She is not in acute distress.     Appearance: She is well-developed.   HENT:      Head: Normocephalic and atraumatic.      Nose: Nose normal.   Eyes:      Conjunctiva/sclera: Conjunctivae normal.   Pulmonary:      Effort: Pulmonary effort is normal.   Abdominal:      General: There is no distension.   Musculoskeletal:      Cervical back: Normal range of motion and neck supple.   Skin:     Findings: No rash.      Comments: No icterus   Neurological:      Mental Status: She is alert and oriented to person, place, and time.         Labs:   Lab Results   Component Value Date    HGBA1C 4.8 04/04/2023       Lab Results   Component Value Date    CIY2JFNNIAUG 2.030 09/07/2022       Lab Results   Component Value Date    CREATININE 0.91 03/13/2025    CREATININE 0.82 04/04/2023    CREATININE 0.91 09/07/2022    BUN 22 03/13/2025    K " 4.5 03/13/2025     03/13/2025    CO2 27 03/13/2025     eGFR   Date Value Ref Range Status   03/13/2025 89 > OR = 60 mL/min/1.73m2 Final   09/07/2022 87 ml/min/1.73sq m Final       Lab Results   Component Value Date    ALT 10 03/13/2025    AST 18 03/13/2025    ALKPHOS 41 03/13/2025       Lab Results   Component Value Date    CHOLESTEROL 165 03/13/2025    CHOLESTEROL 174 04/04/2023    CHOLESTEROL 135 09/07/2022     Lab Results   Component Value Date    HDL 66 03/13/2025    HDL 52 04/04/2023    HDL 44 (L) 09/07/2022     Lab Results   Component Value Date    TRIG 59 03/13/2025    TRIG 67 04/04/2023    TRIG 71 09/07/2022     Lab Results   Component Value Date    NONHDLC 91 09/07/2022         Assessment/Plan:    1. Hypercortisolemia (HCC)  Assessment & Plan:  She as previously seen 2 yrs ago and 1mg DST at the time was normal. We reviewed all the listed conditions and metabolic data was explained to patient.    Recent AM cortisol was elevated at 33ug/dL - I am unclear about its significance as this is not a screening test for cushing's. Note URBAN testing was unremarkable. She does not have hyperglycemia, hypertension or hypokalemia. She does not have central adiposity.  BMI is 30 but she is weight training and seems to be same weight since 5/2023.    At this time, we agreed to do the following:  Do salivary cortisol test.  Repeat 1mg DST.  Avoid AM cortisol testing unless being done for screening adrenal insufficiency.  Follow up as needed in 1 year.      Orders:  -     Ambulatory Referral to Endocrinology  -     dexamethasone (DECADRON) 1 mg tablet; Take 1 tab at 11pm and get cortisol levels checked between 7:00 a.m. and 9:00 a.m. on the next morning.  -     Cortisol Level,7-9 AM Specimen; Future  -     SALIVARY CORTISOL,THREE SPECIMENS; Future  -     ACTH; Future  -     Insulin-like growth factor 1 (IGF-1); Future  -     Cortisol Level,7-9 AM Specimen  -     ACTH  -     Insulin-like growth factor 1 (IGF-1)  2.  Class 1 obesity due to excess calories without serious comorbidity with body mass index (BMI) of 30.0 to 30.9 in adult  Assessment & Plan:  Her weight is stable. She seems to be in homeostasis with HIIT training, 1500 calorie diet and no other evidence of metabolic syndrome.        I have spent a total time of 40 minutes on 05/08/25 in caring for this patient including greater than 50% of this time was spent in counseling/coordination of care as listed above.       Discussed with the patient and all questioned fully answered. She will contact me with concerns.    Livia Platt

## 2025-05-08 NOTE — ASSESSMENT & PLAN NOTE
She as previously seen 2 yrs ago and 1mg DST at the time was normal. We reviewed all the listed conditions and metabolic data was explained to patient.    Recent AM cortisol was elevated at 33ug/dL - I am unclear about its significance as this is not a screening test for cushing's. Note URBAN testing was unremarkable. She does not have hyperglycemia, hypertension or hypokalemia. She does not have central adiposity.  BMI is 30 but she is weight training and seems to be same weight since 5/2023.    At this time, we agreed to do the following:  Do salivary cortisol test.  Repeat 1mg DST.  Avoid AM cortisol testing unless being done for screening adrenal insufficiency.  Follow up as needed in 1 year.

## 2025-05-08 NOTE — ASSESSMENT & PLAN NOTE
Her weight is stable. She seems to be in homeostasis with HIIT training, 1500 calorie diet and no other evidence of metabolic syndrome.